# Patient Record
Sex: FEMALE | Race: WHITE | NOT HISPANIC OR LATINO | Employment: FULL TIME | URBAN - METROPOLITAN AREA
[De-identification: names, ages, dates, MRNs, and addresses within clinical notes are randomized per-mention and may not be internally consistent; named-entity substitution may affect disease eponyms.]

---

## 2017-06-23 ENCOUNTER — APPOINTMENT (OUTPATIENT)
Dept: RADIOLOGY | Facility: MEDICAL CENTER | Age: 32
End: 2017-06-23
Attending: EMERGENCY MEDICINE
Payer: COMMERCIAL

## 2017-06-23 ENCOUNTER — HOSPITAL ENCOUNTER (EMERGENCY)
Facility: MEDICAL CENTER | Age: 32
End: 2017-06-23
Attending: EMERGENCY MEDICINE
Payer: COMMERCIAL

## 2017-06-23 VITALS
WEIGHT: 114 LBS | DIASTOLIC BLOOD PRESSURE: 99 MMHG | SYSTOLIC BLOOD PRESSURE: 131 MMHG | HEART RATE: 94 BPM | OXYGEN SATURATION: 100 % | TEMPERATURE: 98.3 F | RESPIRATION RATE: 18 BRPM | BODY MASS INDEX: 20.2 KG/M2 | HEIGHT: 63 IN

## 2017-06-23 DIAGNOSIS — S90.32XA CONTUSION OF LEFT FOOT, INITIAL ENCOUNTER: ICD-10-CM

## 2017-06-23 PROCEDURE — A9270 NON-COVERED ITEM OR SERVICE: HCPCS | Performed by: EMERGENCY MEDICINE

## 2017-06-23 PROCEDURE — 99284 EMERGENCY DEPT VISIT MOD MDM: CPT

## 2017-06-23 PROCEDURE — 73630 X-RAY EXAM OF FOOT: CPT | Mod: LT

## 2017-06-23 PROCEDURE — 700102 HCHG RX REV CODE 250 W/ 637 OVERRIDE(OP): Performed by: EMERGENCY MEDICINE

## 2017-06-23 RX ORDER — OXYCODONE HYDROCHLORIDE AND ACETAMINOPHEN 5; 325 MG/1; MG/1
1 TABLET ORAL ONCE
Status: COMPLETED | OUTPATIENT
Start: 2017-06-23 | End: 2017-06-23

## 2017-06-23 RX ORDER — HYDROCODONE BITARTRATE AND ACETAMINOPHEN 5; 325 MG/1; MG/1
1-2 TABLET ORAL EVERY 4 HOURS PRN
Qty: 20 TAB | Refills: 0 | Status: SHIPPED | OUTPATIENT
Start: 2017-06-23 | End: 2018-09-05

## 2017-06-23 RX ADMIN — OXYCODONE HYDROCHLORIDE AND ACETAMINOPHEN 1 TABLET: 5; 325 TABLET ORAL at 14:58

## 2017-06-23 ASSESSMENT — PAIN SCALES - GENERAL: PAINLEVEL_OUTOF10: 10

## 2017-06-23 NOTE — ED AVS SNAPSHOT
Zite Access Code: [email or web address]  Expires: 7/23/2017  4:18 PM    Your email address is not on file at [facility].  Email Addresses are required for you to sign up for Zite, please contact [phone] to verify your personal information and to provide your email address prior to attempting to register for Zite.    Megan Guerra  [address] [place] [address]    Zite  A secure, online tool to manage your health information     [facility]’s Zite® is a secure, online tool that connects you to your personalized health information from the privacy of your home -- day or night - making it very easy for you to manage your healthcare. Once the activation process is completed, you can even access your medical information using the Zite janet, which is available for free in the Apple Janet store or Google Play store.     To learn more about Zite, visit [email or web address]    There are two levels of access available (as shown below):   My Chart Features  [facility] Primary Care Doctor [facility]  Specialists [facility]  Urgent  Care Non-[facility] Primary Care Doctor   Email your healthcare team securely and privately 24/7 X X X    Manage appointments: schedule your next appointment; view details of past/upcoming appointments X      Request prescription refills. X      View recent personal medical records, including lab and immunizations X X X X   View health record, including health history, allergies, medications X X X X   Read reports about your outpatient visits, procedures, consult and ER notes X X X X   See your discharge summary, which is a recap of your hospital and/or ER visit that includes your diagnosis, lab results, and care plan X X  X     How to register for Zite:  Once your e-mail address has been verified, follow the following steps to sign up for Zite.     1. Go to  [email or web address]  2. Click on the Sign Up Now box, which takes you to the New Member Sign Up page. You will  need to provide the following information:  a. Enter your NXVISION Access Code exactly as it appears at the top of this page. (You will not need to use this code after you’ve completed the sign-up process. If you do not sign up before the expiration date, you must request a new code.)   b. Enter your date of birth.   c. Enter your home email address.   d. Click Submit, and follow the next screen’s instructions.  3. Create a [email or web address] ID. This will be your NXVISION login ID and cannot be changed, so think of one that is secure and easy to remember.  4. Create a NXVISION password. You can change your password at any time.  5. Enter your Password Reset Question and Answer. This can be used at a later time if you forget your password.   6. Enter your e-mail address. This allows you to receive e-mail notifications when new information is available in NXVISION.  7. Click Sign Up. You can now view your health information.    For assistance activating your NXVISION account, call [phone]

## 2017-06-23 NOTE — ED PROVIDER NOTES
"ED Provider Note    Scribed for Master Downing D.O. by Sidney Hall. 6/23/2017  2:21 PM    Primary care provider: No primary care provider on file.  Means of arrival: walk-in  History obtained from: patient  History limited by: none    CHIEF COMPLAINT  Chief Complaint   Patient presents with   • Ankle Pain       HPI  Megan Guerra is a 31 y.o. female who presents to the Emergency Department for evaluation of left foot pain, located to the bottom of her foot which radiates to the side of her foot. This incident occurred less than 45 minutes prior to arrival. Patient reports she was getting in her car when she felt a pop in the ball of her left foot.  She does not take pain medications. She is allergic to Ibuprofen. She denies fever.     REVIEW OF SYSTEMS  Pertinent positives include left foot pain. Pertinent negatives include no fever, no loss of sensation or strength to her foot.  E    PAST MEDICAL HISTORY  Past Medical History   Diagnosis Date   • HPV in female    • Asthma        SURGICAL HISTORY  Past Surgical History   Procedure Laterality Date   • Gyn surgery       tubal ligation        SOCIAL HISTORY  Social History   Substance Use Topics   • Smoking status: Current Every Day Smoker -- 0.75 packs/day     Types: Cigarettes   • Smokeless tobacco: Not on file   • Alcohol Use: No      History   Drug Use No       FAMILY HISTORY  None noted    CURRENT MEDICATIONS  Home Medications     **Home medications have not yet been reviewed for this encounter**          ALLERGIES  Allergies   Allergen Reactions   • Ibuprofen      \"closes up my throat\"       PHYSICAL EXAM  VITAL SIGNS: /99 mmHg  Pulse 94  Temp(Src) 36.8 °C (98.3 °F) (Temporal)  Resp 18  Ht 1.6 m (5' 3\")  Wt 51.71 kg (114 lb)  BMI 20.20 kg/m2  SpO2 100%    Nursing notes and vitals reviewed.  Constitutional: Well developed, Well nourished, No acute distress, Non-toxic appearance.   Musculoskeletal: Intact distal pulses, No edema, No " cyanosis, No clubbing. Good range of motion in all major joints. Tenderness to the left 1st through 5th metatarsal, no step-off deformity, distal cap refill less than 2 seconds, no malleoli tenderness, no fibular head tenderness on the left, dorsalis pedis and posterior tibial pulses are brisk  Neurologic: Alert & oriented x 3, strength and sensation intact left lower extremity Psychiatric: Affect normal for clinical presentation.    DIAGNOSTIC STUDIES/PROCEDURES    RADIOLOGY  DX-FOOT-COMPLETE 3+ LEFT   Final Result      No acute fracture identified.      The radiologist's interpretation of all radiological studies have been reviewed by me.    COURSE & MEDICAL DECISION MAKING  Pertinent Labs & Imaging studies reviewed. (See chart for details)    2:21 PM - Patient seen and examined at bedside. Patient will be treated with Percocet. Ordered DX foot to evaluate her symptoms. Explained certain types of fractures of the foot. Will have her evaluated using a non weight bearing and then weight bearing test.   This is a charming 31 y.o. female that presents with ankle contusion. She has no evidence of fracture on x-ray. She received 1 dose of Percocet in the emergency department prescription for the same. The patient was placed in a postoperative shoe and crutches were given. She is neurovascularly intact pre-and post-splint application.     Although at this point the patient does not have a fracture on x-ray there is a possibility the patient has sustained an occult fracture. For this reason, the patient is to followup with their primary care physician or orthopedist on call within one week if they continue to have pain or return sooner for increasing pain.  3:57 PM Patient reevaluated at bedside. Patient is sitting on a gurney. Discussed the imaging results with the patient which are negative for fractures. Will order a splint application for the patient. She is to follow up with a physician as an outpatient. She  "understands the plan of care and is agreeable. She agrees to be discharged home. /99 mmHg  Pulse 94  Temp(Src) 36.8 °C (98.3 °F) (Temporal)  Resp 18  Ht 1.6 m (5' 3\")  Wt 51.71 kg (114 lb)  BMI 20.20 kg/m2  SpO2 100%    I reviewed prescription monitoring program for patient's narcotic use before prescribing a scheduled drug.The patient will not drink alcohol nor drive with prescribed medications. The patient will return for new or worsening symptoms and is stable at the time of discharge.    The patient is referred to a primary physician for blood pressure management, diabetic screening, and for all other preventative health concerns.    DISPOSITION:  Patient will be discharged home in stable condition.    FOLLOW UP:  [facility], Emergency Dept  [address] [place] [id number] [phone]    If symptoms worsen      OUTPATIENT MEDICATIONS:  New Prescriptions    HYDROCODONE-ACETAMINOPHEN (NORCO) 5-325 MG TAB PER TABLET    Take 1-2 Tabs by mouth every four hours as needed.           FINAL IMPRESSION  1. Contusion of left foot, initial encounter          Sidney SCOTT (Darylibe), am scribing for, and in the presence of, Master Downing D.O    Electronically signed by: Sidney Hall (Eric), 6/23/2017    Master SCOTT D.O. personally performed the services described in this documentation, as scribed by Sidney Hall in my presence, and it is both accurate and complete.    The note accurately reflects work and decisions made by me.  Master Downing  6/23/2017  4:26 PM          "

## 2017-06-23 NOTE — ED NOTES
"Pt just got into town from [place]. Pt states \"popped her left foot\" getting into car. No deformity noted.   "

## 2017-06-23 NOTE — ED AVS SNAPSHOT
Home Care Instructions                                                                                                                Megan Guerra   MRN: [id number]    Department:  [facility], Emergency Dept   Date of Visit:  6/23/2017            [facility], Emergency Dept    [address] [place] [address]    Phone:  [phone]      You were seen by     Master Downing D.O.      Your Diagnosis Was     Contusion of left foot, initial encounter     S90.32XA       These are the medications you received during your hospitalization from 06/23/2017 1325 to 06/23/2017 1624     Date/Time Order Dose Route Action    06/23/2017 1458 oxycodone-acetaminophen (PERCOCET) 5-325 MG per tablet 1 Tab 1 Tab Oral Given      Follow-up Information     1. Follow up with [facility], Emergency Dept.    Specialty:  Emergency Medicine    Why:  If symptoms worsen    Contact information    [address] [place] [id number] [phone]      Medication Information     Review all of your home medications and newly ordered medications with your primary doctor and/or pharmacist as soon as possible. Follow medication instructions as directed by your doctor and/or pharmacist.     Please keep your complete medication list with you and share with your physician. Update the information when medications are discontinued, doses are changed, or new medications (including over-the-counter products) are added; and carry medication information at all times in the event of emergency situations.               Medication List      START taking these medications        Instructions    Morning Afternoon Evening Bedtime    hydrocodone-acetaminophen 5-325 MG Tabs per tablet   Commonly known as:  NORCO        Take 1-2 Tabs by mouth every four hours as needed.   Dose:  1-2 Tab                          ASK your doctor about these medications        Instructions    Morning Afternoon  Evening Bedtime    albuterol 108 (90 BASE) MCG/ACT Aers inhalation aerosol        Inhale 2 Puffs by mouth every 6 hours as needed for Shortness of Breath.   Dose:  2 Puff                             Where to Get Your Medications      You can get these medications from any pharmacy     Bring a paper prescription for each of these medications    - hydrocodone-acetaminophen 5-325 MG Tabs per tablet            Procedures and tests performed during your visit     DX-FOOT-COMPLETE 3+ LEFT            Patient Information     Patient Information    Following emergency treatment: all patient requiring follow-up care must return either to a private physician or a clinic if your condition worsens before you are able to obtain further medical attention, please return to the emergency room.     Billing Information    At [facility], we work to make the billing process streamlined for our patients.  Our Representatives are here to answer any questions you may have regarding your hospital bill.  If you have insurance coverage and have supplied your insurance information to us, we will submit a claim to your insurer on your behalf.  Should you have any questions regarding your bill, we can be reached online or by phone as follows:  Online: You are able pay your bills online or live chat with our representatives about any billing questions you may have. We are here to help Monday - Friday from 8:00am to 7:30pm and 9:00am - 12:00pm on Saturdays.  Please visit [email or web address]  for more information.   Phone:  [phone] or [phone]    Please note that your emergency physician, surgeon, pathologist, radiologist, anesthesiologist, and other specialists are not employed by [facility] and will therefore bill separately for their services.  Please contact them directly for any questions concerning their bills at the numbers below:     Emergency Physician Services:  [phone]  [facility]:  [phone]  [facility]:  [phone]  [facility]:  [phone]    1. Your final bill may vary from the amount quoted upon discharge if all procedures are not complete at that time, or if your doctor has additional procedures of which we are not aware. You will receive an additional bill if you return to the Emergency Department at [facility] for suture removal regardless of the facility of which the sutures were placed.     2. Please arrange for settlement of this account at the emergency registration.    3. All self-pay accounts are due in full at the time of treatment.  If you are unable to meet this obligation then payment is expected within 4-5 days.     4. If you have had radiology studies (CT, X-ray, Ultrasound, MRI), you have received a preliminary result during your emergency department visit. Please contact the radiology department [phone] to receive a copy of your final result. Please discuss the Final result with your primary physician or with the follow up physician provided.     Crisis Hotline:  [place] Crisis Hotline:  8-271-VJAHCDV or 1-205.311.6785  [place] Crisis Hotline:    1-532.125.8988 or 235-367-6932         ED Discharge Follow Up Questions    1. In order to provide you with very good care, we would like to follow up with a phone call in the next few days.  May we have your permission to contact you?     YES /  NO    2. What is the best phone number to call you? (       )_____-__________    3. What is the best time to call you?      Morning  /  Afternoon  /  Evening                   Patient Signature:  ____________________________________________________________    Date:  ____________________________________________________________

## 2017-06-23 NOTE — ED AVS SNAPSHOT
6/23/2017    Megan Guerra  [address] [address]    Dear Megan:    [facility] wants to ensure your discharge home is safe and you or your loved ones have had all of your questions answered regarding your care after you leave the hospital.    Below is a list of resources and contact information should you have any questions regarding your hospital stay, follow-up instructions, or active medical symptoms.    Questions or Concerns Regarding… Contact   Medical Questions Related to Your Discharge  (7 days a week, 8am-5pm) Contact a Nurse Care Coordinator   [phone]   Medical Questions Not Related to Your Discharge  (24 hours a day / 7 days a week)  Contact the Nurse Health Line   [phone]    Medications or Discharge Instructions Refer to your discharge packet   or contact your [facility] Primary Care Provider   [phone]   Follow-up Appointment(s) Schedule your appointment via Meridium   or contact Scheduling [phone]   Billing Review your statement via Meridium  or contact Billing [phone]   Medical Records Review your records via Meridium   or contact Medical Records [phone]     You may receive a telephone call within two days of discharge. This call is to make certain you understand your discharge instructions and have the opportunity to have any questions answered. You can also easily access your medical information, test results and upcoming appointments via the Meridium free online health management tool. You can learn more and sign up at [email or web address]. For assistance setting up your Meridium account, please call [phone].    Once again, we want to ensure your discharge home is safe and that you have a clear understanding of any next steps in your care. If you have any questions or concerns, please do not hesitate to contact us, we are here for you. Thank you for choosing [facility] for your healthcare needs.    Sincerely,    Your [facility] Healthcare Team

## 2017-10-22 ENCOUNTER — APPOINTMENT (OUTPATIENT)
Dept: RADIOLOGY | Facility: MEDICAL CENTER | Age: 32
End: 2017-10-22
Attending: EMERGENCY MEDICINE
Payer: COMMERCIAL

## 2017-10-22 ENCOUNTER — HOSPITAL ENCOUNTER (EMERGENCY)
Facility: MEDICAL CENTER | Age: 32
End: 2017-10-22
Attending: EMERGENCY MEDICINE
Payer: COMMERCIAL

## 2017-10-22 VITALS
RESPIRATION RATE: 16 BRPM | TEMPERATURE: 99.6 F | SYSTOLIC BLOOD PRESSURE: 117 MMHG | BODY MASS INDEX: 20.24 KG/M2 | OXYGEN SATURATION: 97 % | HEIGHT: 62 IN | WEIGHT: 110 LBS | HEART RATE: 82 BPM | DIASTOLIC BLOOD PRESSURE: 77 MMHG

## 2017-10-22 DIAGNOSIS — S69.90XS: ICD-10-CM

## 2017-10-22 DIAGNOSIS — R20.2 PARESTHESIA: ICD-10-CM

## 2017-10-22 PROCEDURE — 99284 EMERGENCY DEPT VISIT MOD MDM: CPT

## 2017-10-22 PROCEDURE — 700102 HCHG RX REV CODE 250 W/ 637 OVERRIDE(OP): Performed by: EMERGENCY MEDICINE

## 2017-10-22 PROCEDURE — 73130 X-RAY EXAM OF HAND: CPT | Mod: LT

## 2017-10-22 PROCEDURE — A9270 NON-COVERED ITEM OR SERVICE: HCPCS | Performed by: EMERGENCY MEDICINE

## 2017-10-22 RX ORDER — CEPHALEXIN 500 MG/1
500 CAPSULE ORAL 3 TIMES DAILY
Qty: 21 CAP | Refills: 0 | Status: SHIPPED | OUTPATIENT
Start: 2017-10-22 | End: 2017-10-29

## 2017-10-22 RX ORDER — CEPHALEXIN 500 MG/1
500 CAPSULE ORAL ONCE
Status: COMPLETED | OUTPATIENT
Start: 2017-10-22 | End: 2017-10-22

## 2017-10-22 RX ORDER — HYDROCODONE BITARTRATE AND ACETAMINOPHEN 7.5; 325 MG/1; MG/1
1 TABLET ORAL ONCE
Status: COMPLETED | OUTPATIENT
Start: 2017-10-22 | End: 2017-10-22

## 2017-10-22 RX ADMIN — CEPHALEXIN 500 MG: 500 CAPSULE ORAL at 18:19

## 2017-10-22 RX ADMIN — HYDROCODONE BITARTRATE AND ACETAMINOPHEN 1 TABLET: 7.5; 325 TABLET ORAL at 16:58

## 2017-10-22 ASSESSMENT — PAIN SCALES - GENERAL
PAINLEVEL_OUTOF10: 3
PAINLEVEL_OUTOF10: 8

## 2017-10-22 ASSESSMENT — LIFESTYLE VARIABLES: DO YOU DRINK ALCOHOL: NO

## 2017-10-22 NOTE — LETTER
"  FORM C-4:  EMPLOYEE’S CLAIM FOR COMPENSATION/ REPORT OF INITIAL TREATMENT  EMPLOYEE’S CLAIM - PROVIDE ALL INFORMATION REQUESTED   First Name  Megan Last Name  Charlie Birthdate             Age  1985 32 y.o. Sex  female Claim Number   Home Employee Address  [address]  [facility]                                     Zip  [address] Height  1.575 m (5' 2\") Weight  49.9 kg (110 lb) [facility]  [id number]   Mailing Employee Address                           [address]   [facility]               Zip  [address] Telephone  [phone] (home)  Primary Language Spoken  ENGLISH   Insurer  *** Third Party   MISC WORKERS COMP Employee's Occupation (Job Title) When Injury or Occupational Disease Occurred     Employer's Name   Telephone      Employer Address   City   State   Zip     Date of Injury  10/20/2017       Hour of Injury  12:38 PM Date Employer Notified  10/20/2017 Last Day of Work after Injury or Occupational Disease  10/20/2017 Supervisor to Whom Injury Reported  Osman Blanco   Address or Location of Accident (if applicable)  [[address]   What were you doing at the time of accident? (if applicable)  Moving packages down a conveyor belt    How did this injury or occupational disease occur? Be specific and answer in detail. Use additional sheet if necessary)  pushing packages down the conveyor line on to a table when middle and ring finger were pulled into conveyor transition   If you believe that you have an occupational disease, when did you first have knowledge of the disability and it relationship to your employment?   Witnesses to the Accident  work crew     Nature of Injury or Occupational Disease  Contusion  Part(s) of Body Injured or Affected  Hand (L), Finger (L), N/A    I certify that the above is true and correct to the best of my knowledge and that I have provided this information in order to obtain the benefits of [place] " [place] inclusive or Chapter 617 of [place]).  I hereby authorize any physician, chiropractor, surgeon, practitioner, or other person, any hospital, including [facility] or [facility] hospital, any medical service organization, any insurance company, or other institution or organization to release to each other, any medical or other information, including benefits paid or payable, pertinent to this injury or disease, except information relative to diagnosis, treatment and/or counseling for AIDS, psychological conditions, alcohol or controlled substances, for which I must give specific authorization.  A Photostat of this authorization shall be as valid as the original.   Date Place   Employee’s Signature   THIS REPORT MUST BE COMPLETED AND MAILED WITHIN 3 WORKING DAYS OF TREATMENT   Place  [facility], EMERGENCY DEPT  Name of Facility   [facility]   Date  10/22/2017 Diagnosis  No diagnosis found. Is there evidence the injured employee was under the influence of alcohol and/or another controlled substance at the time of accident?   Hour  5:59 PM Description of Injury or Disease       Treatment     Have you advised the patient to remain off work five days or more?             X-Ray Findings      If Yes   From Date    To Date      From information given by the employee, together with medical evidence, can you directly connect this injury or occupational disease as job incurred?    If No, is the employee capable of: Full Duty    Modified Duty      Is additional medical care by a physician indicated?    If Modified Duty, Specify any Limitations / Restrictions        Do you know of any previous injury or disease contributing to this condition or occupational disease?      Date  10/22/2017 Print Doctor’s Name  Jcarlos Bass I certify the employer’s copy of this form was mailed on:   Address  [address] [place] [address]  "[id number]  [phone] Insurer’s Use Only   [facility] Zip  [address]    Provider’s Tax ID Number  [id number] Telephone  Dept: [phone]    Doctor’s Signature    Degree       Original - TREATING PHYSICIAN OR CHIROPRACTOR   Pg 2-Insurer/TPA   Pg 3-Employer   Pg 4-Employee                                                                                                  Form C-4 (rev01/03)     BRIEF DESCRIPTION OF RIGHTS AND BENEFITS  (Pursuant to [place]RS 616C.050)    Notice of Injury or Occupational Disease (Incident Report Form C-1): If an injury or occupational disease (OD) arises out of and in the course of employment, you must provide written notice to your employer as soon as practicable, but no later than 7 days after the accident or OD. Your employer shall maintain a sufficient supply of the required forms.    Claim for Compensation (Form C-4): If medical treatment is sought, the form C-4 is available at the place of initial treatment. A completed \"Claim for Compensation\" (Form C-4) must be filed within 90 days after an accident or OD. The treating physician or chiropractor must, within 3 working days after treatment, complete and mail to the employer, the employer's insurer and third-party , the Claim for Compensation.    Medical Treatment: If you require medical treatment for your on-the-job injury or OD, you may be required to select a physician or chiropractor from a list provided by your workers’ compensation insurer, if it has contracted with an Organization for Managed Care (MCO) or Preferred Provider Organization (PPO) or providers of health care. If your employer has not entered into a contract with an MCO or PPO, you may select a physician or chiropractor from the Panel of Physicians and Chiropractors. Any medical costs related to your industrial injury or OD will be paid by your insurer.    Temporary Total Disability (TTD): If your doctor has certified that you are unable " to work for a period of at least 5 consecutive days, or 5 cumulative days in a 20-day period, or places restrictions on you that your employer does not accommodate, you may be entitled to TTD compensation.    Temporary Partial Disability (TPD): If the wage you receive upon reemployment is less than the compensation for TTD to which you are entitled, the insurer may be required to pay you TPD compensation to make up the difference. TPD can only be paid for a maximum of 24 months.    Permanent Partial Disability (PPD): When your medical condition is stable and there is an indication of a PPD as a result of your injury or OD, within 30 days, your insurer must arrange for an evaluation by a rating physician or chiropractor to determine the degree of your PPD. The amount of your PPD award depends on the date of injury, the results of the PPD evaluation and your age and wage.    Permanent Total Disability (PTD): If you are medically certified by a treating physician or chiropractor as permanently and totally disabled and have been granted a PTD status by your insurer, you are entitled to receive monthly benefits not to exceed 66 2/3% of your average monthly wage. The amount of your PTD payments is subject to reduction if you previously received a PPD award.    Vocational Rehabilitation Services: You may be eligible for vocational rehabilitation services if you are unable to return to the job due to a permanent physical impairment or permanent restrictions as a result of your injury or occupational disease.    Transportation and Per Fani Reimbursement: You may be eligible for travel expenses and per fani associated with medical treatment.  Reopening: You may be able to reopen your claim if your condition worsens after claim closure.    Appeal Process: If you disagree with a written determination issued by the insurer or the insurer does not respond to your request, you may appeal to the Department of Administration,  , by following the instructions contained in your determination letter. You must appeal the determination within 70 days from the date of the determination letter at [address], [address], [place], [place] [address], or [address], [place], [place] [address]. If you disagree with the  decision, you may appeal to the Department of Administration, . You must file your appeal within 30 days from the date of the  decision letter at [address], [address], [place], [place] [address], or [address], [address], [place], [place] [address]. If you disagree with a decision of an , you may file a petition for judicial review with the District Court. You must do so within 30 days of the Appeal Officer’s decision. You may be represented by an  at your own expense or you may contact the [facility] for possible representation.    [place]  for Injured Workers (NAIW): If you disagree with a  decision, you may request that NAIW represent you without charge at an  Hearing. For information regarding denial of benefits, you may contact the [facility] at: [address], [address], [place] [address], [phone], or [address], [place] [address], [phone]    To File a Complaint with the Division: If you wish to file a complaint with the  of the Division of Industrial Relations (DIR), please contact the Workers’ Compensation Section, [address], [address], [place], [place] [address], telephone [phone] [phone], or [address], [facility] [address], [place] [address], telephone ([phone].    For assistance with Workers’ Compensation Issues: you may contact the Office of the Governor Consumer Health Assistance, [address], [address], [place], [place] [address], Toll Free 1-384.955.6951, Web  site: http://chris..nv.us, E-mail [email or web address]                                                                                                                                                                               __________________________________________________________________                                    _________________            Employee Name / Signature                                                                                                                            Date                                       D-2 (rev. 10/07)

## 2017-10-22 NOTE — LETTER
"  FORM C-4:  EMPLOYEE’S CLAIM FOR COMPENSATION/ REPORT OF INITIAL TREATMENT  EMPLOYEE’S CLAIM - PROVIDE ALL INFORMATION REQUESTED   First Name  Megan Last Name  Charlie Birthdate             Age  1985 32 y.o. Sex  female Claim Number   Home Employee Address  [address]  [facility]                                     Zip  [address] Height  1.575 m (5' 2\") Weight  49.9 kg (110 lb) [facility]  [id number]   Mailing Employee Address                           [address]   [facility]               Zip  [address] Telephone  [phone] (home)  Primary Language Spoken  ENGLISH   Insurer  *** Third Party   MISC WORKERS COMP Employee's Occupation (Job Title) When Injury or Occupational Disease Occurred     Employer's Name   Telephone      Employer Address   City   State   Zip     Date of Injury  10/20/2017       Hour of Injury  12:38 PM Date Employer Notified  10/20/2017 Last Day of Work after Injury or Occupational Disease  10/20/2017 Supervisor to Whom Injury Reported  Osman Blanco   Address or Location of Accident (if applicable)  [[address] [address]   What were you doing at the time of accident? (if applicable)  Moving packages down a conveyor belt    How did this injury or occupational disease occur? Be specific and answer in detail. Use additional sheet if necessary)  pushing packages down the conveyor line on to a table when middle and ring finger were pulled into conveyor transition   If you believe that you have an occupational disease, when did you first have knowledge of the disability and it relationship to your employment?   Witnesses to the Accident  work crew     Nature of Injury or Occupational Disease  Contusion  Part(s) of Body Injured or Affected  Hand (L), Finger (L), N/A    I certify that the above is true and correct to the best of my knowledge and that I have provided this information in order to obtain the benefits of [place]s [place] " [place] and [place] ([place] 616A to 616[place], inclusive or Chapter 617 of [place]RS).  I hereby authorize any physician, chiropractor, surgeon, practitioner, or other person, any hospital, including [facility] hospital, any medical service organization, any insurance company, or other institution or organization to release to each other, any medical or other information, including benefits paid or payable, pertinent to this injury or disease, except information relative to diagnosis, treatment and/or counseling for AIDS, psychological conditions, alcohol or controlled substances, for which I must give specific authorization.  A Photostat of this authorization shall be as valid as the original.   Date Place   Employee’s Signature   THIS REPORT MUST BE COMPLETED AND MAILED WITHIN 3 WORKING DAYS OF TREATMENT   Place  [facility], EMERGENCY DEPT  Name of Facility   [facility]   Date  10/22/2017 Diagnosis  No diagnosis found. Is there evidence the injured employee was under the influence of alcohol and/or another controlled substance at the time of accident?   Hour  6:10 PM Description of Injury or Disease       Treatment     Have you advised the patient to remain off work five days or more?             X-Ray Findings      If Yes   From Date    To Date      From information given by the employee, together with medical evidence, can you directly connect this injury or occupational disease as job incurred?    If No, is the employee capable of: Full Duty    Modified Duty      Is additional medical care by a physician indicated?    If Modified Duty, Specify any Limitations / Restrictions        Do you know of any previous injury or disease contributing to this condition or occupational disease?      Date  10/22/2017 Print Doctor’s Name  Jcarlos Bass I certify the employer’s copy of this form was mailed on:   Address  [address] [place] [address]  "[address]  [phone] Insurer’s Use Only   [facility] Zip  [address]    Provider’s Tax ID Number  [id number] Telephone  Dept: [phone]    Doctor’s Signature    Degree       Original - TREATING PHYSICIAN OR CHIROPRACTOR   Pg 2-Insurer/TPA   Pg 3-Employer   Pg 4-Employee                                                                                                  Form C-4 (rev01/03)     BRIEF DESCRIPTION OF RIGHTS AND BENEFITS  (Pursuant to NRS 616C.050)    Notice of Injury or Occupational Disease (Incident Report Form C-1): If an injury or occupational disease (OD) arises out of and in the course of employment, you must provide written notice to your employer as soon as practicable, but no later than 7 days after the accident or OD. Your employer shall maintain a sufficient supply of the required forms.    Claim for Compensation (Form C-4): If medical treatment is sought, the form C-4 is available at the place of initial treatment. A completed \"Claim for Compensation\" (Form C-4) must be filed within 90 days after an accident or OD. The treating physician or chiropractor must, within 3 working days after treatment, complete and mail to the employer, the employer's insurer and third-party , the Claim for Compensation.    Medical Treatment: If you require medical treatment for your on-the-job injury or OD, you may be required to select a physician or chiropractor from a list provided by your workers’ compensation insurer, if it has contracted with an Organization for Managed Care (MCO) or Preferred Provider Organization (PPO) or providers of health care. If your employer has not entered into a contract with an MCO or PPO, you may select a physician or chiropractor from the Panel of Physicians and Chiropractors. Any medical costs related to your industrial injury or OD will be paid by your insurer.    Temporary Total Disability (TTD): If your doctor has certified that you are unable " to work for a period of at least 5 consecutive days, or 5 cumulative days in a 20-day period, or places restrictions on you that your employer does not accommodate, you may be entitled to TTD compensation.    Temporary Partial Disability (TPD): If the wage you receive upon reemployment is less than the compensation for TTD to which you are entitled, the insurer may be required to pay you TPD compensation to make up the difference. TPD can only be paid for a maximum of 24 months.    Permanent Partial Disability (PPD): When your medical condition is stable and there is an indication of a PPD as a result of your injury or OD, within 30 days, your insurer must arrange for an evaluation by a rating physician or chiropractor to determine the degree of your PPD. The amount of your PPD award depends on the date of injury, the results of the PPD evaluation and your age and wage.    Permanent Total Disability (PTD): If you are medically certified by a treating physician or chiropractor as permanently and totally disabled and have been granted a PTD status by your insurer, you are entitled to receive monthly benefits not to exceed 66 2/3% of your average monthly wage. The amount of your PTD payments is subject to reduction if you previously received a PPD award.    Vocational Rehabilitation Services: You may be eligible for vocational rehabilitation services if you are unable to return to the job due to a permanent physical impairment or permanent restrictions as a result of your injury or occupational disease.    Transportation and Per Fani Reimbursement: You may be eligible for travel expenses and per fani associated with medical treatment.  Reopening: You may be able to reopen your claim if your condition worsens after claim closure.    Appeal Process: If you disagree with a written determination issued by the insurer or the insurer does not respond to your request, you may appeal to the Department of Administration,  , by following the instructions contained in your determination letter. You must appeal the determination within 70 days from the date of the determination letter at [address], [address], [place], [place] [address], or [address], [place], [place] [address]. If you disagree with the  decision, you may appeal to the Department of Administration, . You must file your appeal within 30 days from the date of the  decision letter at [address], [address], [place], [place] [address], or [address], [address], [place], [place] [address]. If you disagree with a decision of an , you may file a petition for judicial review with the District Court. You must do so within 30 days of the Appeal Officer’s decision. You may be represented by an  at your own expense or you may contact the [facility] for possible representation.    [place]  for Injured Workers (NAIW): If you disagree with a  decision, you may request that NAIW represent you without charge at an  Hearing. For information regarding denial of benefits, you may contact the [facility] at: [address], [address], [place] [address], [phone], or [address], [place] [address], [phone]    To File a Complaint with the Division: If you wish to file a complaint with the  of the Division of Industrial Relations (DIR), please contact the Workers’ Compensation Section, [address], [address], [place], [place] [address], telephone ([phone]) [phone], or [address] [facility] [address], [place] [address], telephone [phone].    For assistance with Workers’ Compensation Issues: you may contact the Office of the Governor Consumer Health Assistance, [address], [address], [place], [place] [address], Toll Free 1-538.861.6838, Web  site: http://chris..nv.us, E-mail [email or web address]                                                                                                                                                                               __________________________________________________________________                                    _________________            Employee Name / Signature                                                                                                                            Date                                       D-2 (rev. 10/07)

## 2017-10-22 NOTE — LETTER
[facility], EMERGENCY DEPT   [address] [place], [place] [address]  Phone: Dept: [phone] - Fax:        [facility] Progress Report and Disability Certification  Date of Service: 10/22/2017   No Show:  No  Date / Time of Next Visit: 10/24/2017   Claim Information   Patient Name: Megan Guerra  Claim Number:     Employer:   [employer] Date of Injury: 10/20/2017     Insurer / TPA: Jessica ID / SSN:    Occupation:  Diagnosis: Diagnoses of Finger injury, sequela and Paresthesia were pertinent to this visit.    Medical Information   Related to Industrial Injury? Yes ***   Subjective Complaints:  Pain of 3rd and 4th fingers after laceration repair yesterday at [facility]   Objective Findings: Avulsion of skin of area between PIP and DIP joint extending into the volar fat pad of the 3rd and 4th digits of left hand. Limited range of motion   Pre-Existing Condition(s):     Assessment:   Condition Worsened    Status: Additional Care Required  Permanent Disability:No    Plan: Medication    Diagnostics: X-ray    Comments:  No fracture present    Disability Information   Status: Temporarily Totally Disabled    From:  10/22/2017  Through: 10/24/2017 Restrictions are: Temporary   Physical Restrictions   Sitting:    Standing:    Stooping:    Bending:      Squatting:    Walking:    Climbing:    Pushing:      Pulling:    Other:    Reaching Above Shoulder (L):   Reaching Above Shoulder (R):       Reaching Below Shoulder (L):    Reaching Below Shoulder (R):      Not to exceed Weight Limits   Carrying(hrs):   Weight Limit(lb):   Lifting(hrs):   Weight  Limit(lb):     Comments: No use of left hand for grasping until cleared by occupational therapy    Repetitive Actions   Hands: i.e. Fine Manipulations from Grasping:     Feet: i.e. Operating Foot Controls:     Driving / Operate Machinery:     Physician Name: Angelica Bass Physician Signature: ANGELICA Lin M.D. e-Signature:   , Medical Director   Clinic Name / Location: [facility], EMERGENCY DEPT  [address]  [place] [address]  [phone]     Clinic Phone Number: Dept: [phone]   Appointment Time:  Visit Start Time:    Check-In Time:  3:51 PM Visit Discharge Time:    Original-Treating Physician or Chiropractor    Page 2-Insurer/TPA    Page 3-Employer    Page 4-Employee

## 2017-10-22 NOTE — LETTER
"  FORM C-4:  EMPLOYEE’S CLAIM FOR COMPENSATION/ REPORT OF INITIAL TREATMENT  EMPLOYEE’S CLAIM - PROVIDE ALL INFORMATION REQUESTED   First Name  Megan Last Name  Charlie Birthdate             Age  1985 32 y.o. Sex  female Claim Number   Home Employee Address  [address]  [place]                                     Zip  [address] Height  1.575 m (5' 2\") Weight  49.9 kg (110 lb) [facility]     Mailing Employee Address                           [address]   [place]               Zip  [address] Telephone  [phone] (home)  Primary Language Spoken  ENGLISH   Insurer  UNKNOWN Third Party   MISC WORKERS COMP Employee's Occupation (Job Title) When Injury or Occupational Disease Occurred     Employer's Name  [employer] Telephone   [phone]   Employer Address  [address] [facility] Zip  [address]   Date of Injury  10/20/2017       Hour of Injury  12:38 PM Date Employer Notified  10/20/2017 Last Day of Work after Injury or Occupational Disease  10/20/2017 Supervisor to Whom Injury Reported  Osman Blanco   Address or Location of Accident (if applicable)  [[address]]   What were you doing at the time of accident? (if applicable)  Moving packages down a conveyor belt    How did this injury or occupational disease occur? Be specific and answer in detail. Use additional sheet if necessary)  pushing packages down the conveyor line on to a table when middle and ring finger were pulled into conveyor transition   If you believe that you have an occupational disease, when did you first have knowledge of the disability and it relationship to your employment?   Witnesses to the Accident  work crew     Nature of Injury or Occupational Disease  Contusion  Part(s) of Body Injured or Affected  Hand (L), Finger (L), N/A    I certify that the above is true and correct to the best of my knowledge and that I have provided this information in order " to obtain the benefits of [place] or [place] 617 of [place]  I hereby authorize any physician, chiropractor, surgeon, practitioner, or other person, any hospital, including [facility] or [facility] hospital, any medical service organization, any insurance company, or other institution or organization to release to each other, any medical or other information, including benefits paid or payable, pertinent to this injury or disease, except information relative to diagnosis, treatment and/or counseling for AIDS, psychological conditions, alcohol or controlled substances, for which I must give specific authorization.  A Photostat of this authorization shall be as valid as the original.   Date Place   Employee’s Signature   THIS REPORT MUST BE COMPLETED AND MAILED WITHIN 3 WORKING DAYS OF TREATMENT   Place  [facility], EMERGENCY DEPT  Name of Facility   [facility]   Date  10/22/2017 Diagnosis  No diagnosis found. Is there evidence the injured employee was under the influence of alcohol and/or another controlled substance at the time of accident?   Hour  6:16 PM Description of Injury or Disease       Treatment     Have you advised the patient to remain off work five days or more?             X-Ray Findings      If Yes   From Date    To Date      From information given by the employee, together with medical evidence, can you directly connect this injury or occupational disease as job incurred?    If No, is the employee capable of: Full Duty    Modified Duty      Is additional medical care by a physician indicated?    If Modified Duty, Specify any Limitations / Restrictions        Do you know of any previous injury or disease contributing to this condition or occupational disease?      Date  10/22/2017 Print Doctor’s Name  Jcarlos Bass I certify the employer’s copy of this form was mailed on:   "  Address  [address] [place] [id number] [phone] Insurer’s Use Only   [facility]  [address]    Provider’s Tax ID Number  [id number] Telephone  Dept: [phone]    Doctor’s Signature  e-ANGELICA Ellis M.D. Degree       Original - TREATING PHYSICIAN OR CHIROPRACTOR   Pg 2-Insurer/TPA   Pg 3-Employer   Pg 4-Employee                                                                                                  Form C-4 (rev01/03)     BRIEF DESCRIPTION OF RIGHTS AND BENEFITS  (Pursuant to [place]RS 616C.050)    Notice of Injury or Occupational Disease (Incident Report Form C-1): If an injury or occupational disease (OD) arises out of and in the course of employment, you must provide written notice to your employer as soon as practicable, but no later than 7 days after the accident or OD. Your employer shall maintain a sufficient supply of the required forms.    Claim for Compensation (Form C-4): If medical treatment is sought, the form C-4 is available at the place of initial treatment. A completed \"Claim for Compensation\" (Form C-4) must be filed within 90 days after an accident or OD. The treating physician or chiropractor must, within 3 working days after treatment, complete and mail to the employer, the employer's insurer and third-party , the Claim for Compensation.    Medical Treatment: If you require medical treatment for your on-the-job injury or OD, you may be required to select a physician or chiropractor from a list provided by your workers’ compensation insurer, if it has contracted with an Organization for Managed Care (MCO) or Preferred Provider Organization (PPO) or providers of health care. If your employer has not entered into a contract with an MCO or PPO, you may select a physician or chiropractor from the Panel of Physicians and Chiropractors. Any medical costs related to your industrial injury or OD will be paid by your insurer.    Temporary Total " Disability (TTD): If your doctor has certified that you are unable to work for a period of at least 5 consecutive days, or 5 cumulative days in a 20-day period, or places restrictions on you that your employer does not accommodate, you may be entitled to TTD compensation.    Temporary Partial Disability (TPD): If the wage you receive upon reemployment is less than the compensation for TTD to which you are entitled, the insurer may be required to pay you TPD compensation to make up the difference. TPD can only be paid for a maximum of 24 months.    Permanent Partial Disability (PPD): When your medical condition is stable and there is an indication of a PPD as a result of your injury or OD, within 30 days, your insurer must arrange for an evaluation by a rating physician or chiropractor to determine the degree of your PPD. The amount of your PPD award depends on the date of injury, the results of the PPD evaluation and your age and wage.    Permanent Total Disability (PTD): If you are medically certified by a treating physician or chiropractor as permanently and totally disabled and have been granted a PTD status by your insurer, you are entitled to receive monthly benefits not to exceed 66 2/3% of your average monthly wage. The amount of your PTD payments is subject to reduction if you previously received a PPD award.    Vocational Rehabilitation Services: You may be eligible for vocational rehabilitation services if you are unable to return to the job due to a permanent physical impairment or permanent restrictions as a result of your injury or occupational disease.    Transportation and Per Fani Reimbursement: You may be eligible for travel expenses and per fani associated with medical treatment.  Reopening: You may be able to reopen your claim if your condition worsens after claim closure.    Appeal Process: If you disagree with a written determination issued by the insurer or the insurer does not respond to your  request, you may appeal to the Department of Administration, , by following the instructions contained in your determination letter. You must appeal the determination within 70 days from the date of the determination letter at [address], [address], [place], [place] [address], or [address], [address], [place], [place] [address]. If you disagree with the  decision, you may appeal to the Department of Administration, . You must file your appeal within 30 days from the date of the  decision letter at [address], [address], [place], [place] [address], or [address], [address], [place], [place] [address]. If you disagree with a decision of an , you may file a petition for judicial review with the District Court. You must do so within 30 days of the Appeal Officer’s decision. You may be represented by an  at your own expense or you may contact the [facility] for possible representation.    [place]  for Injured Workers (NAIW): If you disagree with a  decision, you may request that NAIW represent you without charge at an  Hearing. For information regarding denial of benefits, you may contact the [facility] at: [address], [address], [place] [address], [phone], or [address], [place] [address], [phone]    To File a Complaint with the Division: If you wish to file a complaint with the  of the Division of Industrial Relations (DIR), please contact the Workers’ Compensation Section, [address], [address], [place], [place] [address], telephone (815) [phone], or [address], [facility] [address], [place] [address], telephone [phone].    For assistance with Workers’ Compensation Issues: you may contact the Office of the Governor Consumer Health Assistance, [address], [address], [place], [place] [address], Toll Free 1-904.738.4307, Web site: http://govcha.[facility].nv., E-mail carlos [email or web address].                                                                                                                                                                               __________________________________________________________________                                    _________________            Employee Name / Signature                                                                                                                            Date                                       D-2 (rev. 10/07)

## 2017-10-22 NOTE — LETTER
"  FORM C-4:  EMPLOYEE’S CLAIM FOR COMPENSATION/ REPORT OF INITIAL TREATMENT  EMPLOYEE’S CLAIM - PROVIDE ALL INFORMATION REQUESTED   First Name  Megan Last Name  Charlie Birthdate             Age  1985 32 y.o. Sex  female Claim Number   Home Employee Address  [address]  [facility]                                     Zip  [address] Height  1.575 m (5' 2\") Weight  49.9 kg (110 lb) [facility]     Mailing Employee Address                           [address]   [facility]               Zip  [address] Telephone  [phone] (home)  Primary Language Spoken  ENGLISH   Insurer  UNKNOWN Third Party   MISC WORKERS COMP Employee's Occupation (Job Title) When Injury or Occupational Disease Occurred     Employer's Name  [employer] Telephone  [phone]    Employer Address  [address] [facility] Zip  [address]   Date of Injury  10/20/2017       Hour of Injury  12:38 PM Date Employer Notified  10/20/2017 Last Day of Work after Injury or Occupational Disease  10/20/2017 Supervisor to Whom Injury Reported  Osman Blanco   Address or Location of Accident (if applicable)  [[address]]   What were you doing at the time of accident? (if applicable)  Moving packages down a conveyor belt    How did this injury or occupational disease occur? Be specific and answer in detail. Use additional sheet if necessary)  pushing packages down the conveyor line on to a table when middle and ring finger were pulled into conveyor transition   If you believe that you have an occupational disease, when did you first have knowledge of the disability and it relationship to your employment?   Witnesses to the Accident  work crew     Nature of Injury or Occupational Disease  Contusion  Part(s) of Body Injured or Affected  Hand (L), Finger (L), N/A    I certify that the above is true and correct to the best of my knowledge and that I have provided this information in order " to obtain the benefits of [place]’s [place] 616A [place], inclusive or Chapter 617 of [place]RS).  I hereby authorize any physician, chiropractor, surgeon, practitioner, or other person, any hospital, including [facility], any medical service organization, any insurance company, or other institution or organization to release to each other, any medical or other information, including benefits paid or payable, pertinent to this injury or disease, except information relative to diagnosis, treatment and/or counseling for AIDS, psychological conditions, alcohol or controlled substances, for which I must give specific authorization.  A Photostat of this authorization shall be as valid as the original.   Date Place   Employee’s Signature   THIS REPORT MUST BE COMPLETED AND MAILED WITHIN 3 WORKING DAYS OF TREATMENT   Place  [facility], EMERGENCY DEPT  Name of Facility   [facility]   Date  10/22/2017 Diagnosis  (S69.90XS) Finger injury, sequela  (R20.2) Paresthesia Is there evidence the injured employee was under the influence of alcohol and/or another controlled substance at the time of accident?   Hour  6:22 PM Description of Injury or Disease  Finger injury, sequela  Paresthesia     Treatment     Have you advised the patient to remain off work five days or more?             X-Ray Findings      If Yes   From Date    To Date      From information given by the employee, together with medical evidence, can you directly connect this injury or occupational disease as job incurred?    If No, is the employee capable of: Full Duty    Modified Duty      Is additional medical care by a physician indicated?    If Modified Duty, Specify any Limitations / Restrictions        Do you know of any previous injury or disease contributing to this condition or occupational disease?      Date  10/22/2017 Print Doctor’s  "Name  Angelica Bass I certify the employer’s copy of this form was mailed on:   Address  [address] [address] [phone] Insurer’s Use Only   [facility]  [address]    Provider’s Tax ID Number  [id number] Telephone  Dept: [phone]    Doctor’s Signature  e-ANGELICA Ellis M.D. Degree       Original - TREATING PHYSICIAN OR CHIROPRACTOR   Pg 2-Insurer/TPA   Pg 3-Employer   Pg 4-Employee                                                                                                  Form C-4 (rev01/03)     BRIEF DESCRIPTION OF RIGHTS AND BENEFITS  ([place]ursuant to [place] 616C.050)    Notice of Injury or Occupational Disease (Incident Report Form C-1): If an injury or occupational disease (OD) arises out of and in the course of employment, you must provide written notice to your employer as soon as practicable, but no later than 7 days after the accident or OD. Your employer shall maintain a sufficient supply of the required forms.    Claim for Compensation (Form C-4): If medical treatment is sought, the form C-4 is available at the place of initial treatment. A completed \"Claim for Compensation\" (Form C-4) must be filed within 90 days after an accident or OD. The treating physician or chiropractor must, within 3 working days after treatment, complete and mail to the employer, the employer's insurer and third-party , the Claim for Compensation.    Medical Treatment: If you require medical treatment for your on-the-job injury or OD, you may be required to select a physician or chiropractor from a list provided by your workers’ compensation insurer, if it has contracted with an Organization for Managed Care (MCO) or Preferred Provider Organization (PPO) or providers of health care. If your employer has not entered into a contract with an MCO or PPO, you may select a physician or chiropractor from the Panel of Physicians and Chiropractors. Any medical costs related to your " industrial injury or OD will be paid by your insurer.    Temporary Total Disability (TTD): If your doctor has certified that you are unable to work for a period of at least 5 consecutive days, or 5 cumulative days in a 20-day period, or places restrictions on you that your employer does not accommodate, you may be entitled to TTD compensation.    Temporary Partial Disability (TPD): If the wage you receive upon reemployment is less than the compensation for TTD to which you are entitled, the insurer may be required to pay you TPD compensation to make up the difference. TPD can only be paid for a maximum of 24 months.    Permanent Partial Disability (PPD): When your medical condition is stable and there is an indication of a PPD as a result of your injury or OD, within 30 days, your insurer must arrange for an evaluation by a rating physician or chiropractor to determine the degree of your PPD. The amount of your PPD award depends on the date of injury, the results of the PPD evaluation and your age and wage.    Permanent Total Disability (PTD): If you are medically certified by a treating physician or chiropractor as permanently and totally disabled and have been granted a PTD status by your insurer, you are entitled to receive monthly benefits not to exceed 66 2/3% of your average monthly wage. The amount of your PTD payments is subject to reduction if you previously received a PPD award.    Vocational Rehabilitation Services: You may be eligible for vocational rehabilitation services if you are unable to return to the job due to a permanent physical impairment or permanent restrictions as a result of your injury or occupational disease.    Transportation and Per Fani Reimbursement: You may be eligible for travel expenses and per fani associated with medical treatment.  Reopening: You may be able to reopen your claim if your condition worsens after claim closure.    Appeal Process: If you disagree with a written  determination issued by the insurer or the insurer does not respond to your request, you may appeal to the Department of Administration, , by following the instructions contained in your determination letter. You must appeal the determination within 70 days from the date of the determination letter at [address], [address], [place], [place] [address], or [address], [address], [place], [place] [address]. If you disagree with the  decision, you may appeal to the Department of Administration, . You must file your appeal within 30 days from the date of the  decision letter at [address], [address], [place], [place] [address], or [address], [place], [place] [address]. If you disagree with a decision of an , you may file a petition for judicial review with the District Court. You must do so within 30 days of the Appeal Officer’s decision. You may be represented by an  at your own expense or you may contact the [facility] for possible representation.    [place]  for Injured Workers (NAIW): If you disagree with a  decision, you may request that NAIW represent you without charge at an  Hearing. For information regarding denial of benefits, you may contact the [facility] at: [address], [address], [place] [address], [phone], or [address], [place] [address], [phone]    To File a Complaint with the Division: If you wish to file a complaint with the  of the Division of Industrial Relations (DIR), please contact the Workers’ Compensation Section, [address], [address], [place], [place] [address], telephone (859) 670-1902, or [address], [address] [facility] [address], [place] [address], telephone [phone].    For assistance with Workers’ Compensation Issues: you may contact the Office of  the [facility] Consumer Health Assistance, [address], [address], [place] [address], Toll Free 1-603.911.2321, Web site: http://govcha.[facility].[place]., E-mail carlos [email or web address].[email or web address].                                                                                                                                                                               __________________________________________________________________                                    _________________            Employee Name / Signature                                                                                                                            Date                                       D-2 (rev. 10/07)

## 2017-10-22 NOTE — ED PROVIDER NOTES
ED Provider Note    Scribed for Jcarlos Bass M.D. by Osman Calvert. 10/22/2017  4:38 PM    Primary care provider: Pcp Pt States None  Means of arrival: Private vehicle  History obtained from: Patient  History limited by: None    CHIEF COMPLAINT  Chief Complaint   Patient presents with   • Digit Pain       HPI  Megan Guerra is a 32 y.o. female who presents to the Emergency Department complaining of left 3rd and 4th digit pain from an injury that occurred two days ago. 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 Patient reports initially being seen at [facility]. She is dissatisfied with the treatment and care she received there.  Patient reports associated numbness and tingling to her left 3rd and 4th digits. She is unable to bend the fingers. She otherwise does not report any other associated symptoms at this time. Patient does not report any recent fevers or active bleeding.    REVIEW OF SYSTEMS  Pertinent negatives include no recent fevers, active bleeding.  E      PAST MEDICAL HISTORY   has a past medical history of Asthma and HPV in female.    SURGICAL HISTORY   has a past surgical history that includes gyn surgery.    SOCIAL HISTORY  Social History   Substance Use Topics   • Smoking status: Current Every Day Smoker     Packs/day: 0.75     Types: Cigarettes   • Alcohol use No      History   Drug Use No       FAMILY HISTORY  None noted    CURRENT MEDICATIONS  No current facility-administered medications on file prior to encounter.      Current Outpatient Prescriptions on File Prior to Encounter   Medication Sig Dispense Refill   • hydrocodone-acetaminophen (NORCO) 5-325 MG Tab per tablet Take 1-2 Tabs by mouth every four hours as needed. 20 Tab 0   • albuterol (VENTOLIN OR PROVENTIL) 108 (90 BASE) MCG/ACT Aero Soln inhalation aerosol Inhale 2 Puffs by mouth every 6 hours as needed for Shortness of Breath.        ALLERGIES  Allergies   Allergen Reactions  "  • Ibuprofen      \"closes up my throat\"       PHYSICAL EXAM  VITAL SIGNS: /57   Pulse 87   Temp 37.6 °C (99.6 °F)   Resp 16   Ht 1.575 m (5' 2\")   Wt 49.9 kg (110 lb)   SpO2 97%   BMI 20.12 kg/m²   Constitutional: Well developed, Well nourished, No acute distress, Non-toxic appearance.   HENT: Normocephalic, atraumatic  Extremities: Left 3rd and 4th digits: Middle volar fat pad injury extending to dip, hemostatic.  Neurologic: Alert & oriented x 3, Normal motor function  Psychiatric: Normal mood and affect    RADIOLOGY  DX-HAND 3+ LEFT   Final Result      1.  No fracture or dislocation of LEFT hand.   2.  Soft tissue injury at the palmar aspect of the distal 3rd and 4th digits.        The radiologist's interpretation of all radiological studies have been reviewed by me.    COURSE & MEDICAL DECISION MAKING  Nursing notes, VS, PMSFHx reviewed in chart.    4:38 PM Patient seen and examined at bedside. Patient presents with her left 3rd and 4th digits with middle volar fat pad injury extending to dip. Discussed plan of care which includes xray evaluation.Patient understands and agrees. Ordered for DX fingers left to evaluate. Patient was treated with norco 7.5-325 mg for her symptoms.      6:09 PM Patient reevaluated at bedside. Discussed radiology results as seen above which are overall unrevealing. Will discharge patient. Advised her to follow up with occupational health at [facility]. She will be started on Keflex. Advised her to use tylenol for her pain. I reviewed the hospital policy regarding refills on narcotic pain prescriptions when the patient is being managed by another physician. The patient understands that we can administer medication here for her pain however refill of her narcotic prescription will not be done today, which adheres to hospital policy. The patient understands that it is not appropriate to come to the emergency department for acute exacerbations of chronic pain and she understands " hospital policy and her need to obtain further prescriptions for narcotics through her primary care physician.       The patient will return for new or worsening symptoms and is stable at the time of discharge.  I filled out a D39 and instruct her to follow up with [facility] or [facility] occupational health tomorrow  She is temporarily totally disabled for use of her left hand    DISPOSITION:  Patient will be discharged home in stable condition.    FINAL IMPRESSION  1. Finger injury, sequela    2. Paresthesia          IOsman (Scribe), am scribing for, and in the presence of, Jcarlos Bass M.D..    Electronically signed by: Osman Calvert (Scribe), 10/22/2017    IJcarlos M.D. personally performed the services described in this documentation, as scribed by Osman Calvert in my presence, and it is both accurate and complete.    The note accurately reflects work and decisions made by me.  Jcarlos aBss  10/22/2017  6:22 PM

## 2017-10-22 NOTE — ED NOTES
"Pt c/o left 3rd and 4th digit pain after work injury. Pt seen at [facility] and states \"they did nothing\".   "

## 2017-10-23 NOTE — ED NOTES
Pt appears stable, NAD noted. Pt states is ready to DC. Pt states understanding of all discharge instructions, follow up instructions, and discharge prescriptions. Pt changing into home clothes independently.

## 2017-10-23 NOTE — DISCHARGE INSTRUCTIONS
Please see either [facility] or [facility] occupational health tomorrow in follow-up. Cannot return to work until cleared by occupational health. Take Flexeril as directed    Paresthesia  Paresthesia is an abnormal burning or prickling sensation. This sensation is generally felt in the hands, arms, legs, or feet. However, it may occur in any part of the body. Usually, it is not painful. The feeling may be described as:  · Tingling or numbness.  · Pins and needles.  · Skin crawling.  · Buzzing.  · Limbs falling asleep.  · Itching.  Most people experience temporary (transient) paresthesia at some time in their lives. Paresthesia may occur when you breathe too quickly (hyperventilation). It can also occur without any apparent cause. Commonly, paresthesia occurs when pressure is placed on a nerve. The sensation quickly goes away after the pressure is removed. For some people, however, paresthesia is a long-lasting (chronic) condition that is caused by an underlying disorder. If you continue to have paresthesia, you may need further medical evaluation.  HOME CARE INSTRUCTIONS  Watch your condition for any changes. Taking the following actions may help to lessen any discomfort that you are feeling:  · Avoid drinking alcohol.  · Try acupuncture or massage to help relieve your symptoms.  · Keep all follow-up visits as directed by your health care provider. This is important.  SEEK MEDICAL CARE IF:  · You continue to have episodes of paresthesia.  · Your burning or prickling feeling gets worse when you walk.  · You have pain, cramps, or dizziness.  · You develop a rash.  SEEK IMMEDIATE MEDICAL CARE IF:  · You feel weak.  · You have trouble walking or moving.  · You have problems with speech, understanding, or vision.  · You feel confused.  · You cannot control your bladder or bowel movements.  · You have numbness after an injury.  · You faint.     This information is not intended to replace advice given to you by your health care  provider. Make sure you discuss any questions you have with your health care provider.     Document Released: 12/08/2003 Document Revised: 05/03/2016 Document Reviewed: 12/14/2015  Elsevier Interactive Patient Education ©2016 [employer]

## 2017-10-23 NOTE — ED NOTES
Pt ambulatory to lobby with steady gait, accompanied by family for ride home. All belongings with pt.

## 2018-03-28 ENCOUNTER — APPOINTMENT (OUTPATIENT)
Dept: RADIOLOGY | Facility: MEDICAL CENTER | Age: 33
End: 2018-03-28
Attending: EMERGENCY MEDICINE
Payer: COMMERCIAL

## 2018-03-28 ENCOUNTER — HOSPITAL ENCOUNTER (EMERGENCY)
Facility: MEDICAL CENTER | Age: 33
End: 2018-03-28
Attending: EMERGENCY MEDICINE
Payer: COMMERCIAL

## 2018-03-28 VITALS
OXYGEN SATURATION: 95 % | DIASTOLIC BLOOD PRESSURE: 74 MMHG | HEART RATE: 81 BPM | RESPIRATION RATE: 18 BRPM | WEIGHT: 97.44 LBS | BODY MASS INDEX: 17.93 KG/M2 | SYSTOLIC BLOOD PRESSURE: 107 MMHG | HEIGHT: 62 IN

## 2018-03-28 DIAGNOSIS — R51.9 NONINTRACTABLE HEADACHE, UNSPECIFIED CHRONICITY PATTERN, UNSPECIFIED HEADACHE TYPE: ICD-10-CM

## 2018-03-28 DIAGNOSIS — R63.4 WEIGHT LOSS: ICD-10-CM

## 2018-03-28 DIAGNOSIS — J32.2 ETHMOID SINUSITIS, UNSPECIFIED CHRONICITY: ICD-10-CM

## 2018-03-28 DIAGNOSIS — J32.3 SPHENOID SINUSITIS, UNSPECIFIED CHRONICITY: ICD-10-CM

## 2018-03-28 LAB
ALBUMIN SERPL BCP-MCNC: 4.5 G/DL (ref 3.2–4.9)
ALBUMIN/GLOB SERPL: 1.8 G/DL
ALP SERPL-CCNC: 36 U/L (ref 30–99)
ALT SERPL-CCNC: 11 U/L (ref 2–50)
ANION GAP SERPL CALC-SCNC: 4 MMOL/L (ref 0–11.9)
APPEARANCE UR: CLEAR
AST SERPL-CCNC: 15 U/L (ref 12–45)
BASOPHILS # BLD AUTO: 0.5 % (ref 0–1.8)
BASOPHILS # BLD: 0.02 K/UL (ref 0–0.12)
BILIRUB SERPL-MCNC: 0.8 MG/DL (ref 0.1–1.5)
BILIRUB UR QL STRIP.AUTO: NEGATIVE
BUN SERPL-MCNC: 19 MG/DL (ref 8–22)
CALCIUM SERPL-MCNC: 9.4 MG/DL (ref 8.5–10.5)
CHLORIDE SERPL-SCNC: 108 MMOL/L (ref 96–112)
CO2 SERPL-SCNC: 26 MMOL/L (ref 20–33)
COLOR UR: YELLOW
CREAT SERPL-MCNC: 0.76 MG/DL (ref 0.5–1.4)
CULTURE IF INDICATED INDCX: NO UA CULTURE
EOSINOPHIL # BLD AUTO: 0.15 K/UL (ref 0–0.51)
EOSINOPHIL NFR BLD: 3.5 % (ref 0–6.9)
ERYTHROCYTE [DISTWIDTH] IN BLOOD BY AUTOMATED COUNT: 42.9 FL (ref 35.9–50)
GLOBULIN SER CALC-MCNC: 2.5 G/DL (ref 1.9–3.5)
GLUCOSE BLD-MCNC: 113 MG/DL (ref 65–99)
GLUCOSE SERPL-MCNC: 86 MG/DL (ref 65–99)
GLUCOSE UR STRIP.AUTO-MCNC: NEGATIVE MG/DL
HCT VFR BLD AUTO: 43.2 % (ref 37–47)
HGB BLD-MCNC: 14.4 G/DL (ref 12–16)
IMM GRANULOCYTES # BLD AUTO: 0.01 K/UL (ref 0–0.11)
IMM GRANULOCYTES NFR BLD AUTO: 0.2 % (ref 0–0.9)
KETONES UR STRIP.AUTO-MCNC: NEGATIVE MG/DL
LEUKOCYTE ESTERASE UR QL STRIP.AUTO: NEGATIVE
LIPASE SERPL-CCNC: 52 U/L (ref 11–82)
LYMPHOCYTES # BLD AUTO: 1.28 K/UL (ref 1–4.8)
LYMPHOCYTES NFR BLD: 29.8 % (ref 22–41)
MCH RBC QN AUTO: 30.6 PG (ref 27–33)
MCHC RBC AUTO-ENTMCNC: 33.3 G/DL (ref 33.6–35)
MCV RBC AUTO: 91.9 FL (ref 81.4–97.8)
MICRO URNS: NORMAL
MONOCYTES # BLD AUTO: 0.33 K/UL (ref 0–0.85)
MONOCYTES NFR BLD AUTO: 7.7 % (ref 0–13.4)
NEUTROPHILS # BLD AUTO: 2.51 K/UL (ref 2–7.15)
NEUTROPHILS NFR BLD: 58.3 % (ref 44–72)
NITRITE UR QL STRIP.AUTO: NEGATIVE
NRBC # BLD AUTO: 0 K/UL
NRBC BLD-RTO: 0 /100 WBC
PH UR STRIP.AUTO: 8 [PH]
PLATELET # BLD AUTO: 178 K/UL (ref 164–446)
PMV BLD AUTO: 8.8 FL (ref 9–12.9)
POTASSIUM SERPL-SCNC: 3.9 MMOL/L (ref 3.6–5.5)
PROT SERPL-MCNC: 7 G/DL (ref 6–8.2)
PROT UR QL STRIP: NEGATIVE MG/DL
RBC # BLD AUTO: 4.7 M/UL (ref 4.2–5.4)
RBC UR QL AUTO: NEGATIVE
SODIUM SERPL-SCNC: 138 MMOL/L (ref 135–145)
SP GR UR STRIP.AUTO: 1.01
UROBILINOGEN UR STRIP.AUTO-MCNC: 0.2 MG/DL
WBC # BLD AUTO: 4.3 K/UL (ref 4.8–10.8)

## 2018-03-28 PROCEDURE — 81003 URINALYSIS AUTO W/O SCOPE: CPT

## 2018-03-28 PROCEDURE — 700111 HCHG RX REV CODE 636 W/ 250 OVERRIDE (IP): Performed by: EMERGENCY MEDICINE

## 2018-03-28 PROCEDURE — 85025 COMPLETE CBC W/AUTO DIFF WBC: CPT

## 2018-03-28 PROCEDURE — 700101 HCHG RX REV CODE 250: Performed by: EMERGENCY MEDICINE

## 2018-03-28 PROCEDURE — 70450 CT HEAD/BRAIN W/O DYE: CPT

## 2018-03-28 PROCEDURE — 71046 X-RAY EXAM CHEST 2 VIEWS: CPT

## 2018-03-28 PROCEDURE — 82962 GLUCOSE BLOOD TEST: CPT

## 2018-03-28 PROCEDURE — 83690 ASSAY OF LIPASE: CPT

## 2018-03-28 PROCEDURE — 80053 COMPREHEN METABOLIC PANEL: CPT

## 2018-03-28 PROCEDURE — 99284 EMERGENCY DEPT VISIT MOD MDM: CPT

## 2018-03-28 PROCEDURE — 94640 AIRWAY INHALATION TREATMENT: CPT

## 2018-03-28 RX ORDER — AMOXICILLIN AND CLAVULANATE POTASSIUM 875; 125 MG/1; MG/1
1 TABLET, FILM COATED ORAL 2 TIMES DAILY
Qty: 20 TAB | Refills: 0 | Status: SHIPPED | OUTPATIENT
Start: 2018-03-28 | End: 2018-04-07

## 2018-03-28 RX ORDER — PREDNISONE 20 MG/1
50 TABLET ORAL ONCE
Status: COMPLETED | OUTPATIENT
Start: 2018-03-28 | End: 2018-03-28

## 2018-03-28 RX ADMIN — PREDNISONE 50 MG: 20 TABLET ORAL at 10:52

## 2018-03-28 RX ADMIN — ALBUTEROL SULFATE 2.5 MG: 2.5 SOLUTION RESPIRATORY (INHALATION) at 11:49

## 2018-03-28 RX ADMIN — IPRATROPIUM BROMIDE 0.5 MG: 0.5 SOLUTION RESPIRATORY (INHALATION) at 11:49

## 2018-03-28 ASSESSMENT — PAIN SCALES - GENERAL: PAINLEVEL_OUTOF10: 6

## 2018-03-28 NOTE — ED TRIAGE NOTES
Chief Complaint   Patient presents with   • Headache     Pt reports to have severe allergies for 1 mo. BG in triage 113   • Blurred Vision   • Loss of Appetite   • Fatigue     Explained to pt triage process, made pt aware to tell this RN of any changes/concerns, pt verbalized understanding of process and instructions given. Pt to TESHA valdez.

## 2018-03-28 NOTE — ED NOTES
Initial contact with pt.    Pt c/o ha, blurred vision, congestion, loss of appetite for 4 months. Pt has not seen PCP for symptoms.    Pt has PMH of asthma, used inhaler last week.     Pt Aox4. GCS 15. Speaks in complete sentences. Ambulated to red 10 with steady gait. NAD noted

## 2018-03-28 NOTE — DISCHARGE INSTRUCTIONS

## 2018-03-28 NOTE — ED PROVIDER NOTES
ED Provider Note    CHIEF COMPLAINT  Chief Complaint   Patient presents with   • Headache     Pt reports to have severe allergies for 1 mo. BG in triage 113   • Blurred Vision   • Loss of Appetite   • Fatigue       HPI  Megan Guerra is a 32 y.o. female who presents with frontal headaches for 2 months, nasal congestion.  Patient states she has lost 15 pounds over the past 4 months unexplained.  She has a nausea with loss of appetite.  She states intermittently complains of blurred vision, this is currently resolved.  Vision disturbance described as blurriness in both eyes, no diplopia, no loss of vision.  Patient has had history of pyelonephritis although states the symptoms today do not feel the same.  She states symptoms currently complaining up today have been present on and off over the past 2-3 months.  No trauma.  No stiff neck.  No numbness or weakness to the extremities.  Patient has history of asthma, feels short of breath, states she is concerned about pneumonia.  Patient has related her symptoms to what she believes to be allergies although is unsure of the triggers for her symptoms    REVIEW OF SYSTEMS    Constitutional: Fatigue, denies fever  Respiratory: Short of breath, history of asthma  Cardiac: No chest pain or syncope  Gastrointestinal: Nausea, loss of appetite.  Denies diarrhea  Musculoskeletal: No acute neck or back pain  Neurologic: Headache, described as frontal.  Eyes: Intermittent blurriness       All other systems are negative.       PAST MEDICAL HISTORY  Past Medical History:   Diagnosis Date   • Asthma    • HPV in female        FAMILY HISTORY  History reviewed. No pertinent family history.    SOCIAL HISTORY  Social History     Social History   • Marital status: Single     Spouse name: N/A   • Number of children: N/A   • Years of education: N/A     Social History Main Topics   • Smoking status: Current Every Day Smoker     Packs/day: 1.00     Types: Cigarettes   • Smokeless tobacco: Never  "Used   • Alcohol use No   • Drug use: No   • Sexual activity: Not on file     Other Topics Concern   • Not on file     Social History Narrative   • No narrative on file       SURGICAL HISTORY  Past Surgical History:   Procedure Laterality Date   • GYN SURGERY      tubal ligation       CURRENT MEDICATIONS  Home Medications     Reviewed by Lubna Graham R.N. (Registered Nurse) on 03/28/18 at 1033  Med List Status: Not Addressed   Medication Last Dose Status   albuterol (VENTOLIN OR PROVENTIL) 108 (90 BASE) MCG/ACT Aero Soln inhalation aerosol 1 month Active   hydrocodone-acetaminophen (NORCO) 5-325 MG Tab per tablet  Active                ALLERGIES  Allergies   Allergen Reactions   • Ibuprofen      \"closes up my throat\"       PHYSICAL EXAM  VITAL SIGNS: /74   Pulse 81   Resp 18   Ht 1.575 m (5' 2\")   Wt 44.2 kg (97 lb 7.1 oz)   SpO2 95%   BMI 17.82 kg/m²   Constitutional:  Non-toxic appearance.   HENT: No facial swelling, posterior pharynx normal  Eyes: Anicteric, no conjunctivitis.  Peoples are 3 mm bilateral, extraocular motions normal.  No nystagmus    Cardiovascular: Normal heart rate, Normal rhythm  Pulmonary:  No wheezing, No rales.  Diminished breath sounds bilateral.  Gastrointestinal: Soft, mild suprapubic tenderness, No masses.  Abdomen is flat, bowel sounds are normal.  Skin: Warm, Dry, No cyanosis.  No petechiae, no rash  Neurologic: Speech is clear, follows commands, facial expression is symmetrical.  Sensation and strength are normal in the extremities   Psychiatric: Patient is calm and cooperative.   Musculoskeletal: Chest wall nontender.  No flank tenderness.    EKG/Labs  Results for orders placed or performed during the hospital encounter of 03/28/18   CBC WITH DIFFERENTIAL   Result Value Ref Range    WBC 4.3 (L) 4.8 - 10.8 K/uL    RBC 4.70 4.20 - 5.40 M/uL    Hemoglobin 14.4 12.0 - 16.0 g/dL    Hematocrit 43.2 37.0 - 47.0 %    MCV 91.9 81.4 - 97.8 fL    MCH 30.6 27.0 - 33.0 pg    MCHC " 33.3 (L) 33.6 - 35.0 g/dL    RDW 42.9 35.9 - 50.0 fL    Platelet Count 178 164 - 446 K/uL    MPV 8.8 (L) 9.0 - 12.9 fL    Neutrophils-Polys 58.30 44.00 - 72.00 %    Lymphocytes 29.80 22.00 - 41.00 %    Monocytes 7.70 0.00 - 13.40 %    Eosinophils 3.50 0.00 - 6.90 %    Basophils 0.50 0.00 - 1.80 %    Immature Granulocytes 0.20 0.00 - 0.90 %    Nucleated RBC 0.00 /100 WBC    Neutrophils (Absolute) 2.51 2.00 - 7.15 K/uL    Lymphs (Absolute) 1.28 1.00 - 4.80 K/uL    Monos (Absolute) 0.33 0.00 - 0.85 K/uL    Eos (Absolute) 0.15 0.00 - 0.51 K/uL    Baso (Absolute) 0.02 0.00 - 0.12 K/uL    Immature Granulocytes (abs) 0.01 0.00 - 0.11 K/uL    NRBC (Absolute) 0.00 K/uL   COMP METABOLIC PANEL   Result Value Ref Range    Sodium 138 135 - 145 mmol/L    Potassium 3.9 3.6 - 5.5 mmol/L    Chloride 108 96 - 112 mmol/L    Co2 26 20 - 33 mmol/L    Anion Gap 4.0 0.0 - 11.9    Glucose 86 65 - 99 mg/dL    Bun 19 8 - 22 mg/dL    Creatinine 0.76 0.50 - 1.40 mg/dL    Calcium 9.4 8.5 - 10.5 mg/dL    AST(SGOT) 15 12 - 45 U/L    ALT(SGPT) 11 2 - 50 U/L    Alkaline Phosphatase 36 30 - 99 U/L    Total Bilirubin 0.8 0.1 - 1.5 mg/dL    Albumin 4.5 3.2 - 4.9 g/dL    Total Protein 7.0 6.0 - 8.2 g/dL    Globulin 2.5 1.9 - 3.5 g/dL    A-G Ratio 1.8 g/dL   LIPASE   Result Value Ref Range    Lipase 52 11 - 82 U/L   URINALYSIS,CULTURE IF INDICATED   Result Value Ref Range    Color Yellow     Character Clear     Specific Gravity 1.010 <1.035    Ph 8.0 5.0 - 8.0    Glucose Negative Negative mg/dL    Ketones Negative Negative mg/dL    Protein Negative Negative mg/dL    Bilirubin Negative Negative    Urobilinogen, Urine 0.2 Negative    Nitrite Negative Negative    Leukocyte Esterase Negative Negative    Occult Blood Negative Negative    Micro Urine Req see below     Culture Indicated No UA Culture   ESTIMATED GFR   Result Value Ref Range    GFR If African American >60 >60 mL/min/1.73 m 2    GFR If Non African American >60 >60 mL/min/1.73 m 2   ACCU-CHEK  GLUCOSE   Result Value Ref Range    Glucose - Accu-Ck 113 (H) 65 - 99 mg/dL         RADIOLOGY/PROCEDURES  DX-CHEST-2 VIEWS   Final Result      No acute cardiopulmonary abnormality.      CT-HEAD W/O   Final Result      1.  No acute intracranial abnormality.   2.  Findings of sinusitis as described above.            COURSE & MEDICAL DECISION MAKING  Pertinent Labs & Imaging studies reviewed. (See chart for details)  Urinalysis negative for infection.  I find no evidence of diabetes.  She has normal renal and liver function.  Patient has mild leukopenia, red cell and platelets are normal.  Differential diagnosis included viral syndrome, sinusitis, new-onset cancer with weight loss, weight loss secondary to poor appetite.  The chest x-ray was negative.  There is no evidence of acute trauma.  CT scan of the head revealed a small 8 and sphenoidal sinusitis.  Patient has agreed to use over-the-counter Afrin nasal spray for 3 days only.  She is placed on a ten-day course of Augmentin, this will hopefully relieve the sinusitis and improve her headaches.  Patient is advised the weight loss is of unknown etiology and the need to obtain primary care.  She has been referred to [facility] to establish primary care and for ongoing workup.    FINAL IMPRESSION     1. Ethmoid sinusitis, unspecified chronicity    2. Sphenoid sinusitis, unspecified chronicity    3. Weight loss    4. Nonintractable headache, unspecified chronicity pattern, unspecified headache type                    Electronically signed by: James Leal, 3/28/2018 2:06 PM

## 2018-07-07 ENCOUNTER — HOSPITAL ENCOUNTER (EMERGENCY)
Facility: MEDICAL CENTER | Age: 33
End: 2018-07-07
Attending: EMERGENCY MEDICINE
Payer: COMMERCIAL

## 2018-07-07 VITALS
RESPIRATION RATE: 18 BRPM | SYSTOLIC BLOOD PRESSURE: 109 MMHG | WEIGHT: 99.43 LBS | HEART RATE: 61 BPM | BODY MASS INDEX: 18.3 KG/M2 | DIASTOLIC BLOOD PRESSURE: 86 MMHG | TEMPERATURE: 97.9 F | HEIGHT: 62 IN | OXYGEN SATURATION: 97 %

## 2018-07-07 DIAGNOSIS — S16.1XXA STRAIN OF NECK MUSCLE, INITIAL ENCOUNTER: ICD-10-CM

## 2018-07-07 PROCEDURE — 99283 EMERGENCY DEPT VISIT LOW MDM: CPT

## 2018-07-07 RX ORDER — PREDNISONE 20 MG/1
60 TABLET ORAL DAILY
Qty: 15 TAB | Refills: 0 | Status: SHIPPED | OUTPATIENT
Start: 2018-07-07 | End: 2018-07-12

## 2018-07-07 RX ORDER — CYCLOBENZAPRINE HCL 10 MG
10 TABLET ORAL 3 TIMES DAILY PRN
Qty: 10 TAB | Refills: 0 | Status: SHIPPED | OUTPATIENT
Start: 2018-07-07 | End: 2018-07-10

## 2018-07-07 ASSESSMENT — PAIN SCALES - GENERAL: PAINLEVEL_OUTOF10: 8

## 2018-07-07 ASSESSMENT — LIFESTYLE VARIABLES: DO YOU DRINK ALCOHOL: NO

## 2018-07-07 NOTE — DISCHARGE INSTRUCTIONS
Cervical Sprain  A cervical sprain is a stretch or tear in one or more of the tough, cord-like tissues that connect bones (ligaments) in the neck. Cervical sprains can range from mild to severe. Severe cervical sprains can cause the spinal bones (vertebrae) in the neck to be unstable. This can lead to spinal cord damage and can result in serious nervous system problems.  The amount of time that it takes for a cervical sprain to get better depends on the cause and extent of the injury. Most cervical sprains heal in 4-6 weeks.  What are the causes?  Cervical sprains may be caused by an injury (trauma), such as from a motor vehicle accident, a fall, or sudden forward and backward whipping movement of the head and neck (whiplash injury).  Mild cervical sprains may be caused by wear and tear over time, such as from poor posture, sitting in a chair that does not provide support, or looking up or down for long periods of time.  What increases the risk?  The following factors may make you more likely to develop this condition:  · Participating in activities that have a high risk of trauma to the neck. These include contact sports, auto racing, gymnastics, and diving.  · Taking risks when driving or riding in a motor vehicle, such as speeding.  · Having osteoarthritis of the spine.  · Having poor strength and flexibility of the neck.  · A previous neck injury.  · Having poor posture.  · Spending a lot of time in certain positions that put stress on the neck, such as sitting at a computer for long periods of time.  What are the signs or symptoms?  Symptoms of this condition include:  · Pain, soreness, stiffness, tenderness, swelling, or a burning sensation in the front, back, or sides of the neck.  · Sudden tightening of neck muscles that you cannot control (muscle spasms).  · Pain in the shoulders or upper back.  · Limited ability to move the neck.  · Headache.  · Dizziness.  · Nausea.  · Vomiting.  · Weakness, numbness, or  tingling in a hand or an arm.  Symptoms may develop right away after injury, or they may develop over a few days. In some cases, symptoms may go away with treatment and return (recur) over time.  How is this diagnosed?  This condition may be diagnosed based on:  · Your medical history.  · Your symptoms.  · Any recent injuries or known neck problems that you have, such as arthritis in the neck.  · A physical exam.  · Imaging tests, such as:  ¨ X-rays.  ¨ MRI.  ¨ CT scan.  How is this treated?  This condition is treated by resting and icing the injured area and doing physical therapy exercises. Depending on the severity of your condition, treatment may also include:  · Keeping your neck in place (immobilized) for periods of time. This may be done using:  ¨ A cervical collar. This supports your chin and the back of your head.  ¨ A cervical traction device. This is a sling that holds up your head. This removes weight and pressure from your neck, and it may help to relieve pain.  · Medicines that help to relieve pain and inflammation.  · Medicines that help to relax your muscles (muscle relaxants).  · Surgery. This is rare.  Follow these instructions at home:  If you have a cervical collar:  · Wear it as told by your health care provider. Do not remove the collar unless instructed by your health care provider.  · Ask your health care provider before you make any adjustments to your collar.  · If you have long hair, keep it outside of the collar.  · Ask your health care provider if you can remove the collar for cleaning and bathing. If you are allowed to remove the collar for cleaning or bathing:  ¨ Follow instructions from your health care provider about how to remove the collar safely.  ¨ Clean the collar by wiping it with mild soap and water and drying it completely.  ¨ If your collar has removable pads, remove them every 1-2 days and wash them by hand with soap and water. Let them air-dry completely before you put  them back in the collar.  ¨ Check your skin under the collar for irritation or sores. If you see any, tell your health care provider.  Managing pain, stiffness, and swelling  · If directed, use a cervical traction device as told by your health care provider.  · If directed, apply heat to the affected area before you do your physical therapy or as often as told by your health care provider. Use the heat source that your health care provider recommends, such as a moist heat pack or a heating pad.  ¨ Place a towel between your skin and the heat source.  ¨ Leave the heat on for 20-30 minutes.  ¨ Remove the heat if your skin turns bright red. This is especially important if you are unable to feel pain, heat, or cold. You may have a greater risk of getting burned.  · If directed, put ice on the affected area:  ¨ Put ice in a plastic bag.  ¨ Place a towel between your skin and the bag.  ¨ Leave the ice on for 20 minutes, 2-3 times a day.  Activity  · Do not drive while wearing a cervical collar. If you do not have a cervical collar, ask your health care provider if it is safe to drive while your neck heals.  · Do not drive or use heavy machinery while taking prescription pain medicine or muscle relaxants, unless your health care provider approves.  · Do not lift anything that is heavier than 10 lb (4.5 kg) until your health care provider tells you that it is safe.  · Rest as directed by your health care provider. Avoid positions and activities that make your symptoms worse. Ask your health care provider what activities are safe for you.  · If physical therapy was prescribed, do exercises as told by your health care provider or physical therapist.  General instructions  · Take over-the-counter and prescription medicines only as told by your health care provider.  · Do not use any products that contain nicotine or tobacco, such as cigarettes and e-cigarettes. These can delay healing. If you need help quitting, ask your  health care provider.  · Keep all follow-up visits as told by your health care provider or physical therapist. This is important.  How is this prevented?  To prevent a cervical sprain from happening again:  · Use and maintain good posture. Make any needed adjustments to your workstation to help you use good posture.  · Exercise regularly as directed by your health care provider or physical therapist.  · Avoid risky activities that may cause a cervical sprain.  Contact a health care provider if:  · You have symptoms that get worse or do not get better after 2 weeks of treatment.  · You have pain that gets worse or does not get better with medicine.  · You develop new, unexplained symptoms.  · You have sores or irritated skin on your neck from wearing your cervical collar.  Get help right away if:  · You have severe pain.  · You develop numbness, tingling, or weakness in any part of your body.  · You cannot move a part of your body (you have paralysis).  · You have neck pain along with:  ¨ Severe dizziness.  ¨ Headache.  Summary  · A cervical sprain is a stretch or tear in one or more of the tough, cord-like tissues that connect bones (ligaments) in the neck.  · Cervical sprains may be caused by an injury (trauma), such as from a motor vehicle accident, a fall, or sudden forward and backward whipping movement of the head and neck (whiplash injury).  · Symptoms may develop right away after injury, or they may develop over a few days.  · This condition is treated by resting and icing the injured area and doing physical therapy exercises.  This information is not intended to replace advice given to you by your health care provider. Make sure you discuss any questions you have with your health care provider.  Document Released: 10/14/2008 Document Revised: 08/16/2017 Document Reviewed: 08/16/2017  [employer] Interactive Patient Education © 2017 [employer]

## 2018-07-07 NOTE — ED NOTES
Pt was ambulatory at time of discharge. Pt was A&Ox4. Pt was given discharge paperwork and Rx. Pt was educated on coming back to ed if symptoms returned.

## 2018-07-07 NOTE — ED TRIAGE NOTES
"Chief Complaint   Patient presents with   • Neck Pain     Pt reports \"cracking\" own neck 2 days ago/ pt now with increased pain/ ROM limited/ pt with no numbness/tingling     Explained to pt triage process, made pt aware to tell this RN/staff of any changes/concerns, pt verbalized understanding of process and instructions given. Pt to ER josé miguel.    "

## 2018-07-07 NOTE — ED PROVIDER NOTES
"ED Provider Note    CHIEF COMPLAINT  Chief Complaint   Patient presents with   • Neck Pain     Pt reports \"cracking\" own neck 2 days ago/ pt now with increased pain/ ROM limited/ pt with no numbness/tingling       HPI  Megan Guerra is a 32 y.o. female who presents with neck discomfort. The patient was cracking her neck 2 days ago with her hands forcing her mandible to the right into the left and since that time she's had severe pain throughout the cervical region. There is no radicular component down into her arms. She does not have any paresthesias or functional loss of her extremities. She states the pain is moderate in intensity and worse with certain movements. She states the pain at this time is severe in intensity. She is unaware of any relieving factors.    REVIEW OF SYSTEMS  No recent fevers    PHYSICAL EXAM  VITAL SIGNS: /86   Pulse 86   Temp 36.6 °C (97.9 °F)   Resp 18   Ht 1.575 m (5' 2\")   Wt 45.1 kg (99 lb 6.8 oz)   SpO2 97%   BMI 18.19 kg/m²   In general the patient appears uncomfortable but nontoxic  Cervical spine the patient does have diffuse tenderness. She does not have any midline step-offs. Thoracic and lumbar spine is normal except for some paraspinal muscle discomfort in the upper thoracic region as well.  Extremities atraumatic with good distal pulses  Skin no erythema in the cervical region  Neurologic examination motor is 5 out of 5 and symmetric throughout and sensation is intact    COURSE & MEDICAL DECISION MAKING  Pertinent Labs & Imaging studies reviewed. (See chart for details)  This a 32-year-old female who presents with neck discomfort. I suspect this is more muscular etiology based on her presentation as well as my clinical findings. The patient is neurologically intact at this time. She'll be treated with a 5 day course of prednisone as well as a 3 day course of Flexeril. She'll return for increased pain or weakness to her extremities.    FINAL IMPRESSION  1. " Cervical strain       Disposition  The patient will be discharged in stable condition      Electronically signed by: Paresh Martinez, 7/7/2018 8:42 AM

## 2018-09-05 ENCOUNTER — HOSPITAL ENCOUNTER (EMERGENCY)
Facility: MEDICAL CENTER | Age: 33
End: 2018-09-05
Attending: EMERGENCY MEDICINE
Payer: COMMERCIAL

## 2018-09-05 VITALS
DIASTOLIC BLOOD PRESSURE: 80 MMHG | RESPIRATION RATE: 16 BRPM | OXYGEN SATURATION: 98 % | SYSTOLIC BLOOD PRESSURE: 132 MMHG | HEIGHT: 62 IN | WEIGHT: 97.66 LBS | TEMPERATURE: 97.7 F | BODY MASS INDEX: 17.97 KG/M2 | HEART RATE: 82 BPM

## 2018-09-05 DIAGNOSIS — K02.9 PAIN DUE TO DENTAL CARIES: ICD-10-CM

## 2018-09-05 PROCEDURE — 700102 HCHG RX REV CODE 250 W/ 637 OVERRIDE(OP): Performed by: EMERGENCY MEDICINE

## 2018-09-05 PROCEDURE — 99284 EMERGENCY DEPT VISIT MOD MDM: CPT

## 2018-09-05 PROCEDURE — A9270 NON-COVERED ITEM OR SERVICE: HCPCS | Performed by: EMERGENCY MEDICINE

## 2018-09-05 RX ORDER — HYDROCODONE BITARTRATE AND ACETAMINOPHEN 5; 325 MG/1; MG/1
1-2 TABLET ORAL EVERY 4 HOURS PRN
Qty: 20 TAB | Refills: 0 | Status: SHIPPED | OUTPATIENT
Start: 2018-09-05 | End: 2018-09-10

## 2018-09-05 RX ORDER — HYDROCODONE BITARTRATE AND ACETAMINOPHEN 5; 325 MG/1; MG/1
1 TABLET ORAL ONCE
Status: COMPLETED | OUTPATIENT
Start: 2018-09-05 | End: 2018-09-05

## 2018-09-05 RX ORDER — PENICILLIN V POTASSIUM 500 MG/1
500 TABLET ORAL ONCE
Status: COMPLETED | OUTPATIENT
Start: 2018-09-05 | End: 2018-09-05

## 2018-09-05 RX ORDER — PENICILLIN V POTASSIUM 500 MG/1
500 TABLET ORAL
Qty: 40 TAB | Refills: 0 | Status: SHIPPED | OUTPATIENT
Start: 2018-09-05 | End: 2018-09-15

## 2018-09-05 RX ADMIN — PENICILLIN V POTASIUM 500 MG: 500 TABLET OROPHARYNGEAL at 07:29

## 2018-09-05 RX ADMIN — HYDROCODONE BITARTRATE AND ACETAMINOPHEN 1 TABLET: 5; 325 TABLET ORAL at 07:29

## 2018-09-05 ASSESSMENT — LIFESTYLE VARIABLES: DO YOU DRINK ALCOHOL: NO

## 2018-09-05 ASSESSMENT — PAIN SCALES - GENERAL: PAINLEVEL_OUTOF10: 5

## 2018-09-05 NOTE — ED TRIAGE NOTES
"Megan Guerra  33 y.o. female  Chief Complaint   Patient presents with   • Tooth Ache     \"I broke my tooth about a week ago, I tried to make an appt with the dentist but they won't take me till the 12th. I can't take the pain until the 12th.\"       Pt amb to triage with steady gait for above complaint.   Pt is alert and oriented, speaking in full sentences, follows commands and responds appropriately to questions. NAD. Resp are even and unlabored.  Pt placed in lobby. Pt educated on triage process. Pt encouraged to alert staff for any changes.    "

## 2018-09-05 NOTE — ED PROVIDER NOTES
"ED Provider Note    Scribed for Samir Phillips M.D. by David Ryan. 9/5/2018, 7:16 AM.    Primary care provider: Pcp Pt States None  Means of arrival: walk in  History obtained from: Patient  History limited by: None    CHIEF COMPLAINT  Chief Complaint   Patient presents with   • Tooth Ache     \"I broke my tooth about a week ago, I tried to make an appt with the dentist but they won't take me till the 12th. I can't take the pain until the 12th.\"       HPI  Megan Guerra is a 33 y.o. female who presents complaining of left sided upper toothache. It has been present for 7 days, but is particularly bad at this time. Pain radiates locally.  Patient rates pain as moderate to severe and states that it is worse to chew or bite down. However, patient is able to take orals. No associated breathing/swallowing difficulty. No fever. No nausea, vomiting, chest pain, shortness of breath, weakness, numbness, bowel or bladder changes. There are no other medical complaints at this time. She has a follow up appointment with her dentist on the 12th.     REVIEW OF SYSTEMS  See HPI for further details. Review of systems as above, otherwise all other systems are negative.   E.    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    SOCIAL HISTORY  Social History     Social History Main Topics   • Smoking status: Current Every Day Smoker     Packs/day: 1.00     Types: Cigarettes   • Smokeless tobacco: Never Used   • Alcohol use No   • Drug use: No            SURGICAL HISTORY   has a past surgical history that includes gyn surgery.    CURRENT MEDICATIONS  Current Outpatient Prescriptions:   •  hydrocodone-acetaminophen (NORCO) 5-325 MG Tab per tablet, Take 1-2 Tabs by mouth every four hours as needed., Disp: 20 Tab, Rfl: 0  •  albuterol (VENTOLIN OR PROVENTIL) 108 (90 BASE) MCG/ACT Aero Soln inhalation aerosol, Inhale 2 Puffs by mouth every 6 hours as needed for Shortness of Breath., Disp: , Rfl: " "    ALLERGIES  Allergies   Allergen Reactions   • Ibuprofen      \"closes up my throat\"       PHYSICAL EXAM  VITAL SIGNS: /81   Pulse 85   Temp 36.5 °C (97.7 °F)   Resp 18   Ht 1.575 m (5' 2\")   Wt 44.3 kg (97 lb 10.6 oz)   SpO2 98%   BMI 17.86 kg/m²      Constitutional: Well appearing patient in mild distress from pain.  Not toxic, nor ill in appearance.  HENT: Mucus membranes moist.  Oropharynx is clear.  There are scattered caries.  There is no facial edema.  There is tenderness over tooth 15. Tooth is eroded.  Tongue is normal.  Floor of the mouth is normal.  Submental space is soft.  Posterior pharynx is normal.  Patient is tolerating secretions without difficulty. There is no evidence of Jamarcus's Angina.  Eyes: Pupils equally round.  No scleral icterus.   Neck: Full nontender range of motion; no meningismus.  Lymphatic: No cervical lymphadenopathy noted.   Cardiovascular: Regular heart rate and rhythm.  No murmurs, rubs, nor gallop appreciated.   Thorax & Lungs: Lungs are clear to auscultation with good air movement bilaterally.  No wheeze, rhonchi, nor rales.   Abdomen: Soft, with no tenderness, rebound nor guarding.  No mass, pulsatile mass, nor hepatosplenomegaly appreciated.  Skin: No purpura nor petechia noted.  Extremities/Musculoskeletal: No sign of trauma.  Musculoskeletal: Range of motion is intact in all major joints.  Neurologic: Alert & oriented.  Strength and sensation is intact all around.  No dysmetria.  Gait is normal.  Psychiatric: Normal affect appropriate for the clinical situation.    MEDICAL RECORD  I have reviewed patient's medical record and pertinent results are listed above.    COURSE & MEDICAL DECISION MAKING  I have reviewed any medical record information, laboratory studies and radiographic results as noted above.    Reviewed the patient's prescription history on [place] Prescription Monitoring Program which showed no increased risk of substance abuse.     This patient " presents with a toothache. There is no obvious dental abscess at this time which I can drain. I am prescribing the patient narcotic pain pills and antibiotics. They are asked to follow up with a dentist at soonest possibility. They are counseled that they may get worse before getting better and to return for increasing pain or swelling, breathing/swallowing difficulty, fever, any other concern at all. They are given aftercare instructions on toothache, a dental referral sheet, and they are not to drink or drive on prescribed medications.    I reviewed prescription monitoring program for patient's narcotic use before prescribing a scheduled drug.The patient will not drink alcohol nor drive with prescribed medications. The patient will return for new or worsening symptoms and is stable at the time of discharge.    The patient is referred to a primary physician for blood pressure management, diabetic screening, and for all other preventative health concerns.    In prescribing controlled substances to this patient, I certify that I have obtained and reviewed the medical history of Megan Guerra. I have also made a good matt effort to obtain applicable records from other providers who have treated the patient and records did not demonstrate any increased risk of substance abuse that would prevent me from prescribing controlled substances.     I have conducted a physical exam and documented it. I have reviewed Ms. Guerra’s prescription history as maintained by the [place] Prescription Monitoring Program.     I have assessed the patient’s risk for abuse, dependency, and addiction using the validated Opioid Risk Tool available at https://www.mdcalc.com/erbvxo-mdxt-etvw-ort-narcotic-abuse.     Given the above, I believe the benefits of controlled substance therapy outweigh the risks. The reasons for prescribing controlled substances include in my professional opinion, controlled substances are the only reasonable choice  for this patient because dental pain. Accordingly, I have discussed the risk and benefits, treatment plan, and alternative therapies with the patient.     DISPOSITION:  Patient will be discharged home in stable condition.    FOLLOW UP:  [facility], Emergency Dept  [address] [address] [phone]    If symptoms worsen    OUTPATIENT MEDICATIONS:  New Prescriptions    HYDROCODONE-ACETAMINOPHEN (NORCO) 5-325 MG TAB PER TABLET    Take 1-2 Tabs by mouth every four hours as needed for up to 5 days.    PENICILLIN V POTASSIUM (VEETID) 500 MG TAB    Take 1 Tab by mouth 4 Times a Day,Before Meals and at Bedtime for 10 days.     FINAL IMPRESSION  1. Pain due to dental caries         Electronically signed by: David Ryan, 9/5/2018 7:16 AM    The note accurately reflects work and decisions made by me.  Samir Phillips  9/5/2018  11:20 AM

## 2018-09-05 NOTE — ED NOTES
D/c instructions given and all questions answered. Prescriptions given X 2. Pt to follow up with dentist. Pt verbalized understanding. Pt belongings with patient. Pt ambulatory to lobby. Pt educated not to drive on narcotics. VSS. NAD.

## 2018-09-05 NOTE — DISCHARGE INSTRUCTIONS
Dental Caries  Dental caries (cavities) are areas of tooth decay. Cavities are usually caused by a combination of poor dental care; sugar; tobacco, alcohol, and drug abuse; decreased saliva production; and receding gums. If cavities are not treated by a dentist, they grow in size. This can cause toothaches, infection, and loss of the tooth.  Cavities of the outer tooth enamel do not cause symptoms. Dental pain from cold drinks may be the first sign the enamel has broken down and decay has spread toward the root of the tooth. This can cause the tooth to die or become infected. If a cavity is treated before it causes toothache, the tooth can usually be saved. Cavities can be prevented by good oral hygiene. Brushing your teeth in the morning and before bed, and using dental floss once daily helps remove plaque and reduce bacteria.  Candy, soft drinks, and other sources of sugar promote tooth decay by promoting the growth of bacteria in the mouth. Proper diet, fluoride, dental cleaning, and fillings are important in preventing the loss of teeth from decay. Antibiotics, root canal treatment, or dental extraction may be needed if the decay is severe. Take any pain medication or antibiotics as directed by your caregiver. It is important that you follow up with a dentist for definitive care.  SEEK MEDICAL CARE IF:   · You or your child has an oral temperature above 102° F (38.9° C).   · There is difficulty opening the mouth.   · There is difficulty swallowing or handling secretions.   · There is difficulty breathing.   · There is chest pain.   · There are worsening or concerning symptoms.   Document Released: 01/25/2006 Document Revised: 03/11/2013 Document Reviewed: 04/12/2011  [employer]® Patient Information ©2013 [employer].

## 2018-10-23 ENCOUNTER — HOSPITAL ENCOUNTER (EMERGENCY)
Facility: MEDICAL CENTER | Age: 33
End: 2018-10-23
Attending: EMERGENCY MEDICINE
Payer: COMMERCIAL

## 2018-10-23 VITALS
DIASTOLIC BLOOD PRESSURE: 75 MMHG | BODY MASS INDEX: 18.01 KG/M2 | OXYGEN SATURATION: 98 % | RESPIRATION RATE: 16 BRPM | HEART RATE: 92 BPM | HEIGHT: 62 IN | SYSTOLIC BLOOD PRESSURE: 108 MMHG | WEIGHT: 97.88 LBS | TEMPERATURE: 98.5 F

## 2018-10-23 DIAGNOSIS — B96.89 BACTERIAL VAGINOSIS: ICD-10-CM

## 2018-10-23 DIAGNOSIS — N76.0 BACTERIAL VAGINOSIS: ICD-10-CM

## 2018-10-23 DIAGNOSIS — N89.8 VAGINAL DISCHARGE: ICD-10-CM

## 2018-10-23 LAB
APPEARANCE UR: CLEAR
BACTERIA GENITAL QL WET PREP: NORMAL
BILIRUB UR QL STRIP.AUTO: NEGATIVE
C TRACH DNA SPEC QL NAA+PROBE: NEGATIVE
COLOR UR: YELLOW
GLUCOSE UR STRIP.AUTO-MCNC: NEGATIVE MG/DL
HCG UR QL: NEGATIVE
KETONES UR STRIP.AUTO-MCNC: NEGATIVE MG/DL
LEUKOCYTE ESTERASE UR QL STRIP.AUTO: NEGATIVE
MICRO URNS: NORMAL
N GONORRHOEA DNA SPEC QL NAA+PROBE: NEGATIVE
NITRITE UR QL STRIP.AUTO: NEGATIVE
PH UR STRIP.AUTO: 5.5 [PH]
PROT UR QL STRIP: NEGATIVE MG/DL
RBC UR QL AUTO: NEGATIVE
SIGNIFICANT IND 70042: NORMAL
SITE SITE: NORMAL
SOURCE SOURCE: NORMAL
SP GR UR REFRACTOMETRY: 1.01
SP GR UR STRIP.AUTO: 1.01
SPECIMEN SOURCE: NORMAL
UROBILINOGEN UR STRIP.AUTO-MCNC: 0.2 MG/DL

## 2018-10-23 PROCEDURE — 81025 URINE PREGNANCY TEST: CPT

## 2018-10-23 PROCEDURE — 87491 CHLMYD TRACH DNA AMP PROBE: CPT

## 2018-10-23 PROCEDURE — 99284 EMERGENCY DEPT VISIT MOD MDM: CPT

## 2018-10-23 PROCEDURE — 81003 URINALYSIS AUTO W/O SCOPE: CPT

## 2018-10-23 PROCEDURE — 87591 N.GONORRHOEAE DNA AMP PROB: CPT

## 2018-10-23 RX ORDER — METRONIDAZOLE 500 MG/1
500 TABLET ORAL 3 TIMES DAILY
Qty: 21 TAB | Refills: 0 | Status: SHIPPED | OUTPATIENT
Start: 2018-10-23 | End: 2018-10-30

## 2018-10-23 ASSESSMENT — ENCOUNTER SYMPTOMS
ABDOMINAL PAIN: 0
VOMITING: 0
FEVER: 0

## 2018-10-23 ASSESSMENT — PAIN SCALES - GENERAL: PAINLEVEL_OUTOF10: 0

## 2018-10-23 NOTE — ED PROVIDER NOTES
"ED Provider Note    Scribed for Ignacio Castro II, M.D. by Jesse Rutherford. 10/23/2018  12:52 PM    Means of Arrival: Walk-in  History obtained by: Patient  Limitations: None    CHIEF COMPLAINT  Chief Complaint   Patient presents with   • Vaginal discharge       HPI  Megan Guerra is a 33 y.o. female who presents to the Emergency Department for evaluation of vaginal discharge onset a week ago. She had a normal menstrual period on 10/10 and states they normally last 2-3 days, ending on 10/13. However, she began developing dark brown vaginal discharge 4 days later that has been intermittent since then. She has associated malodor discharge, denies any dysuria, abdominal pain, vomiting, rashes, or fever. She also denies any new sexual partners and last had sexual intercourse several days ago. She is not followed by OB/GYN. She has surgical history of tubal ligation.     REVIEW OF SYSTEMS  Review of Systems   Constitutional: Negative for fever.   Gastrointestinal: Negative for abdominal pain and vomiting.   Genitourinary: Negative for dysuria.        Vaginal bleeding   Skin: Negative for rash.   All other systems reviewed and are negative.  See HPI for further details.    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    SOCIAL HISTORY  Social History     Social History Main Topics   • Smoking status: Current Every Day Smoker     Packs/day: 1.00     Types: Cigarettes   • Smokeless tobacco: Never Used   • Alcohol use No   • Drug use: No     SURGICAL HISTORY   has a past surgical history that includes gyn surgery.    CURRENT MEDICATIONS  Home Medications     Reviewed by Lis Gates R.N. (Registered Nurse) on 10/23/18 at 1224  Med List Status: Complete   Medication Last Dose Status   albuterol (VENTOLIN OR PROVENTIL) 108 (90 BASE) MCG/ACT Aero Soln inhalation aerosol prn Active                ALLERGIES  Allergies   Allergen Reactions   • Ibuprofen      \"closes up my throat\" " "      PHYSICAL EXAM  VITAL SIGNS: /71   Pulse (!) 121   Temp 36.7 °C (98.1 °F)   Resp 16   Ht 1.575 m (5' 2\")   Wt 44.4 kg (97 lb 14.2 oz)   LMP 10/10/2018   SpO2 100%   BMI 17.90 kg/m²     Pulse ox interpretation: I interpret this pulse ox as normal.  Constitutional: Alert in no apparent distress.  female sitting on the edge of bed.   HENT: No signs of trauma, Bilateral external ears normal, Nose normal.   Eyes: Pupils are equal, Conjunctiva normal, Non-icteric.   Neck: Normal range of motion, No tenderness, Supple, No stridor.   Lymphatic: No lymphadenopathy noted. No inguinal lymphadenopathy.   Cardiovascular: Tachycardic, Regular rhythm, no murmurs. Symmetric distal pulses. No cyanosis of extremities. No peripheral edema of extremities.  Thorax & Lungs: Normal breath sounds, No respiratory distress, No wheezing, No chest tenderness.   Abdomen: Bowel sounds normal, Soft, No tenderness, No masses, No pulsatile masses. No peritoneal signs.  Skin: Warm, Dry, No erythema, No rash.   Pelvic Exam: Assisted by female nurse, no obvious lesions on cervix, there is brown discharge, no bright red blood. No CMT.   Musculoskeletal: Good range of motion in all major joints. No tenderness to palpation or major deformities noted.   Neurologic: Alert , Normal motor function, Normal sensory function, No focal deficits noted.   Psychiatric: Affect normal, Judgment normal, Mood normal.       DIAGNOSTIC STUDIES / PROCEDURES    LABS  Results for orders placed or performed during the hospital encounter of 10/23/18   URINALYSIS,CULTURE IF INDICATED   Result Value Ref Range    Color Yellow     Character Clear     Specific Gravity 1.009 <1.035    Ph 5.5 5.0 - 8.0    Glucose Negative Negative mg/dL    Ketones Negative Negative mg/dL    Protein Negative Negative mg/dL    Bilirubin Negative Negative    Urobilinogen, Urine 0.2 Negative    Nitrite Negative Negative    Leukocyte Esterase Negative Negative    Occult Blood " Negative Negative    Micro Urine Req see below    CHLAMYDIA/GC PCR URINE OR SWAB   Result Value Ref Range    Source Vaginal    WET PREP   Result Value Ref Range    Significant Indicator NEG     Source GEN     Site CERVICAL     Wet Prep For Parasites       No motile Trichomonas seen.  No yeast.  Few clue cells seen.     BETA-HCG QUALITATIVE URINE   Result Value Ref Range    Beta-Hcg Urine Negative Negative   REFRACTOMETER SG   Result Value Ref Range    Specific Gravity 1.010    Pertinent Labs & Imaging studies reviewed. (See chart for details)    COURSE & MEDICAL DECISION MAKING  Pertinent Labs & Imaging studies reviewed. (See chart for details)    12:52 PM This is a 33 y.o. female who presents with vaginal discharge and the differential diagnosis includes but is not limited to vaginitis, lower suspicion for pregnancy since she has surgical history of tubal ligation. There is no sign of malignancy, however cannot rule out on exam alone. Plan to test with urinalysis, chlamydia/GC, pregnancy test, and wet prep. Pelvic Exam was discussed with the Megan, which she understands.     1:06 PM Pelvic Exam was performed at this time with assisted female nurse. See above for more details.     1:32 PM Reviewed Megan's labs. Wet prep with a few clue cells, no trichomonas or yeast. UA without signs of infection. Chlamydia and Gonorrhea pending. She will have treatment for bacterial vaginosis. Negative pregnancy test. Heart rate has improved to 92. I suspect it is elevated because she is anxious in a hurry to leave for work.     The patient will not drink alcohol nor drive with prescribed medications. The patient will return for worsening symptoms and is stable at the time of discharge. The patient verbalizes understanding and will comply.    The patient is referred to a primary physician for blood pressure management, diabetic screening, and for all other preventative health concerns.    DISPOSITION:  Patient will be discharged  home in stable condition.    FOLLOW UP:  Mohsen Tamasaby, M.D.  [address]  [place] [id number] [phone]    Call   For follow up appointment, you should have a repeat pelvic exam in the next few weeks    [facility], Emergency Dept  [address]  [place] [id number] [phone]    abdominal pain, vomiting, fevers, rash come back to ER      OUTPATIENT MEDICATIONS:  New Prescriptions    METRONIDAZOLE (FLAGYL) 500 MG TAB    Take 1 Tab by mouth 3 times a day for 7 days.        FINAL IMPRESSION  1. Vaginal discharge    2. Bacterial vaginosis        IJesse (Scribe), am scribing for, and in the presence of, Ignacio Castro II, M.D..    Electronically signed by: Jesse Rutherford (Scribe), 10/23/2018    IIgnacio II, M.D. personally performed the services described in this documentation, as scribed by Jesse Rutherford in my presence, and it is both accurate and complete. C.     The note accurately reflects work and decisions made by me.  Ignacio Castro II  10/23/2018  4:59 PM

## 2018-10-23 NOTE — ED TRIAGE NOTES
Chief Complaint   Patient presents with   • Vaginal Bleeding       Patient states she has a normal period that ended on the 10/13 but since than she has had dark brown and foul smelling discharge for the last 8 days. Denies abdominal pain.     Patient placed back out in lobby and educated on triage process.

## 2018-10-23 NOTE — ED NOTES
Discharged home, pt given prescription x 1 with indication. Pt will follow up with pcp. Return to ed with worsening symptoms.

## 2018-12-18 ENCOUNTER — HOSPITAL ENCOUNTER (EMERGENCY)
Facility: MEDICAL CENTER | Age: 33
End: 2018-12-18
Attending: EMERGENCY MEDICINE
Payer: COMMERCIAL

## 2018-12-18 VITALS
HEART RATE: 100 BPM | TEMPERATURE: 98.4 F | SYSTOLIC BLOOD PRESSURE: 100 MMHG | BODY MASS INDEX: 17.7 KG/M2 | RESPIRATION RATE: 16 BRPM | OXYGEN SATURATION: 99 % | WEIGHT: 99.87 LBS | HEIGHT: 63 IN | DIASTOLIC BLOOD PRESSURE: 61 MMHG

## 2018-12-18 DIAGNOSIS — K08.89 DENTALGIA: ICD-10-CM

## 2018-12-18 PROCEDURE — 99284 EMERGENCY DEPT VISIT MOD MDM: CPT

## 2018-12-18 RX ORDER — AMOXICILLIN 500 MG/1
500 CAPSULE ORAL 3 TIMES DAILY
Qty: 21 CAP | Refills: 0 | Status: SHIPPED | OUTPATIENT
Start: 2018-12-18 | End: 2018-12-25

## 2018-12-18 RX ORDER — HYDROCODONE BITARTRATE AND ACETAMINOPHEN 5; 325 MG/1; MG/1
1 TABLET ORAL EVERY 6 HOURS PRN
Qty: 12 TAB | Refills: 0 | Status: SHIPPED | OUTPATIENT
Start: 2018-12-18 | End: 2018-12-21

## 2018-12-18 ASSESSMENT — PAIN SCALES - GENERAL: PAINLEVEL_OUTOF10: 8

## 2018-12-19 NOTE — ED PROVIDER NOTES
"      ED Provider Note    Scribed for Dr. James sAhford M.D. by Stephen Azevedo. 12/18/2018, 5:23 PM.    Primary Care Provider: Mohsen Tamasaby, M.D.  Means of arrival: Walk In   History obtained from: Patient  History limited by: None    CHIEF COMPLAINT  Chief Complaint   Patient presents with   • Dental Pain     left upper     HPI  Megan Guerra is a 33 y.o. female who presents to the Emergency Department complaining of dental pain.   The patient complains of an onset of dental pain two weeks ago. She complains of constant left upper dental pain that has been progressively worsening since onset. The patient has treated her pain with Oragel without relief. She denies any alleviating factors of her dental pain.   The patient reports she has an appointment scheduled to see a Dentist on 1/24/19, but states she cannot tolerate her dental pain.   The patient has been able to tolerate secretions well without difficulty. The patient denies any fever, nausea, vomiting, sore throat, headache, or neck pain.     REVIEW OF SYSTEMS  Pertinent positives include dental pain. Pertinent negatives include no fever, nausea, vomiting, sore throat, headache, or neck pain.     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    SOCIAL HISTORY  Social History   Substance Use Topics   • Smoking status: Current Every Day Smoker     Packs/day: 1.00     Types: Cigarettes   • Smokeless tobacco: Never Used   • Alcohol use No      History   Drug Use No     SURGICAL HISTORY   has a past surgical history that includes gyn surgery.     CURRENT MEDICATIONS  Home Medications     Reviewed by Dalila Muniz R.N. (Registered Nurse) on 12/18/18 at 1714  Med List Status: Complete   Medication Last Dose Status        Patient Avery Taking any Medications                     ALLERGIES  Allergies   Allergen Reactions   • Ibuprofen      \"closes up my throat\"     PHYSICAL EXAM  VITAL SIGNS: BP (!) 96/71   Pulse (!) 109   Temp 36.9 °C " "(98.4 °F) (Temporal)   Resp 14   Ht 1.6 m (5' 3\")   Wt 45.3 kg (99 lb 13.9 oz)   LMP 12/11/2018   SpO2 97%   BMI 17.69 kg/m²     Pulse ox interpretation: Normal   Constitutional: Well developed, Well nourished, No acute distress, Non-toxic appearance.   HENT: Normocephalic, Atraumatic, Bilateral external ears normal, Oropharynx moist, No oral exudates, Nose normal.  Tenderness to the #18 tooth.  No palpable abscess of the gumline.  No facial swelling or erythema.  Eyes: PERRLA, EOMI, Conjunctiva normal, No discharge.   Cardiovascular: Normal heart rate, Normal rhythm  Thorax & Lungs: No respiratory distress  Skin: Warm, Dry, No erythema, No rash.  Neurologic: Alert & oriented x 3, No focal deficits       COURSE & MEDICAL DECISION MAKING  Nursing notes, VS, PMSFHx reviewed in chart.    5:23 PM - Patient seen and examined at bedside. The patient presents for dental pain.   Patient has an appointment to see dental care on 1/24/19.   She was prescribed Amoxil 500 mg, Norco 5-325 mg for treatment of symptoms as needed until she is able to follow up with a Dentist.       In prescribing controlled substances to this patient, I certify that I have obtained and reviewed the medical history of Megan Guerra. I have also made a good matt effort to obtain applicable records from other providers who have treated the patient and records did not demonstrate any increased risk of substance abuse that would prevent me from prescribing controlled substances.     I have conducted a physical exam and documented it. I have reviewed Ms. Guerra’s prescription history as maintained by the [place] Prescription Monitoring Program.     I have assessed the patient’s risk for abuse, dependency, and addiction using the validated Opioid Risk Tool available at https://www.mdcalc.com/zzwdck-kkiv-mdwa-ort-narcotic-abuse.     Given the above, I believe the benefits of controlled substance therapy outweigh the risks. The reasons for " prescribing controlled substances include non-narcotic, oral analgesic alternatives have been inadequate for pain control. Accordingly, I have discussed the risk and benefits, treatment plan, and alternative therapies with the patient.     The patient will not drink alcohol nor drive with prescribed medications.      Decision Making:  This is a 33 y.o. year old who presents with female presenting with dental pain to the left upper molar.  Pain sensitivity.  Pain with eating.  No facial swelling or erythema.  No palpable abscess of the gumline.  Has an obviously fractured tooth there from obvious decay.  Pain is likely secondary to severe dental decay.  Possibly early periapical abscess or infected pulpitis.  Will treat with antibiotics and analgesic medications.  She has an allergy to NSAID therapy.  Will treat with [place] for the next few days until she can follow-up with a dentist.  Patient was discharged home in stable condition.  To follow-up with dentistry as soon as possible.      DISPOSITION:  Patient will be discharged home in stable condition.    FOLLOW UP:  Mohsen Tamasaby, M.D.  [address] [id number] [phone]    Schedule an appointment as soon as possible for a visit       [facility], Emergency Dept  [address] [facility] [address] [phone]    As needed, If symptoms worsen      OUTPATIENT MEDICATIONS:  New Prescriptions    AMOXICILLIN (AMOXIL) 500 MG CAP    Take 1 Cap by mouth 3 times a day for 7 days.    HYDROCODONE-ACETAMINOPHEN (NORCO) 5-325 MG TAB PER TABLET    Take 1 Tab by mouth every 6 hours as needed for up to 3 days.       FINAL IMPRESSION  1. Dentalgia        This dictation has been created using voice recognition software and/or scribes. The accuracy of the dictation is limited by the abilities of the software and the expertise of the scribes. I expect there may be some errors of grammar and possibly content. I made every attempt  to manually correct the errors within my dictation. However, errors related to voice recognition software and/or scribes may still exist and should be interpreted within the appropriate context.    The note accurately reflects work and decisions made by me.  James Ashford  12/18/2018  5:59 PM

## 2018-12-19 NOTE — ED TRIAGE NOTES
Pt ambulates to triage  Chief Complaint   Patient presents with   • Dental Pain     left upper   No relief with Oragel   Pt asked to wait in lobby, pt updated on triage process and pt asked to inform RN of any changes.

## 2019-01-24 ENCOUNTER — NON-PROVIDER VISIT (OUTPATIENT)
Dept: OCCUPATIONAL MEDICINE | Facility: CLINIC | Age: 34
End: 2019-01-24

## 2019-01-24 ENCOUNTER — OFFICE VISIT (OUTPATIENT)
Dept: OCCUPATIONAL MEDICINE | Facility: CLINIC | Age: 34
End: 2019-01-24

## 2019-01-24 VITALS
OXYGEN SATURATION: 99 % | HEIGHT: 63 IN | WEIGHT: 99 LBS | TEMPERATURE: 98.8 F | SYSTOLIC BLOOD PRESSURE: 102 MMHG | BODY MASS INDEX: 17.54 KG/M2 | RESPIRATION RATE: 16 BRPM | DIASTOLIC BLOOD PRESSURE: 88 MMHG

## 2019-01-24 DIAGNOSIS — Z02.1 PRE-EMPLOYMENT HEALTH SCREENING EXAMINATION: ICD-10-CM

## 2019-01-24 DIAGNOSIS — Z02.1 PRE-EMPLOYMENT DRUG SCREENING: ICD-10-CM

## 2019-01-24 LAB
AMP AMPHETAMINE: NORMAL
BREATH ALCOHOL COMMENT: NORMAL
COC COCAINE: NORMAL
INT CON NEG: NORMAL
INT CON POS: NORMAL
MET METHAMPHETAMINES: NORMAL
OPI OPIATES: NORMAL
PCP PHENCYCLIDINE: NORMAL
POC BREATHALIZER: 0 PERCENT (ref 0–0.01)
POC DRUG COMMENT 753798-OCCUPATIONAL HEALTH: NORMAL
THC: NORMAL

## 2019-01-24 PROCEDURE — 80305 DRUG TEST PRSMV DIR OPT OBS: CPT | Performed by: PREVENTIVE MEDICINE

## 2019-01-24 PROCEDURE — 82075 ASSAY OF BREATH ETHANOL: CPT | Performed by: PREVENTIVE MEDICINE

## 2019-01-24 PROCEDURE — 8915 PR COMPREHENSIVE PHYSICAL: Performed by: PREVENTIVE MEDICINE

## 2019-01-24 PROCEDURE — 92553 AUDIOMETRY AIR & BONE: CPT | Performed by: PREVENTIVE MEDICINE

## 2019-01-29 ENCOUNTER — NON-PROVIDER VISIT (OUTPATIENT)
Dept: OCCUPATIONAL MEDICINE | Facility: CLINIC | Age: 34
End: 2019-01-29

## 2019-01-29 PROCEDURE — 8911 PR MRO FEE: Performed by: INTERNAL MEDICINE

## 2023-08-06 ENCOUNTER — APPOINTMENT (OUTPATIENT)
Dept: RADIOLOGY | Facility: MEDICAL CENTER | Age: 38
End: 2023-08-06
Attending: EMERGENCY MEDICINE
Payer: MEDICAID

## 2023-08-06 ENCOUNTER — HOSPITAL ENCOUNTER (EMERGENCY)
Facility: MEDICAL CENTER | Age: 38
End: 2023-08-06
Attending: EMERGENCY MEDICINE
Payer: MEDICAID

## 2023-08-06 ENCOUNTER — APPOINTMENT (OUTPATIENT)
Dept: RADIOLOGY | Facility: MEDICAL CENTER | Age: 38
End: 2023-08-06
Payer: MEDICAID

## 2023-08-06 VITALS
DIASTOLIC BLOOD PRESSURE: 67 MMHG | OXYGEN SATURATION: 97 % | HEIGHT: 62 IN | HEART RATE: 64 BPM | WEIGHT: 94.8 LBS | BODY MASS INDEX: 17.44 KG/M2 | SYSTOLIC BLOOD PRESSURE: 102 MMHG | TEMPERATURE: 98.3 F | RESPIRATION RATE: 17 BRPM

## 2023-08-06 DIAGNOSIS — K59.00 CONSTIPATION, UNSPECIFIED CONSTIPATION TYPE: ICD-10-CM

## 2023-08-06 DIAGNOSIS — R10.10 PAIN OF UPPER ABDOMEN: ICD-10-CM

## 2023-08-06 LAB
ALBUMIN SERPL BCP-MCNC: 4.5 G/DL (ref 3.2–4.9)
ALBUMIN/GLOB SERPL: 1.6 G/DL
ALP SERPL-CCNC: 42 U/L (ref 30–99)
ALT SERPL-CCNC: 10 U/L (ref 2–50)
ANION GAP SERPL CALC-SCNC: 9 MMOL/L (ref 7–16)
APPEARANCE UR: CLEAR
AST SERPL-CCNC: 16 U/L (ref 12–45)
BASOPHILS # BLD AUTO: 0.2 % (ref 0–1.8)
BASOPHILS # BLD: 0.01 K/UL (ref 0–0.12)
BILIRUB SERPL-MCNC: 0.5 MG/DL (ref 0.1–1.5)
BILIRUB UR QL STRIP.AUTO: NEGATIVE
BUN SERPL-MCNC: 15 MG/DL (ref 8–22)
CALCIUM ALBUM COR SERPL-MCNC: 8.7 MG/DL (ref 8.5–10.5)
CALCIUM SERPL-MCNC: 9.1 MG/DL (ref 8.5–10.5)
CHLORIDE SERPL-SCNC: 106 MMOL/L (ref 96–112)
CO2 SERPL-SCNC: 23 MMOL/L (ref 20–33)
COLOR UR: YELLOW
CREAT SERPL-MCNC: 0.91 MG/DL (ref 0.5–1.4)
EKG IMPRESSION: NORMAL
EOSINOPHIL # BLD AUTO: 0.15 K/UL (ref 0–0.51)
EOSINOPHIL NFR BLD: 3.1 % (ref 0–6.9)
ERYTHROCYTE [DISTWIDTH] IN BLOOD BY AUTOMATED COUNT: 44.7 FL (ref 35.9–50)
GFR SERPLBLD CREATININE-BSD FMLA CKD-EPI: 82 ML/MIN/1.73 M 2
GLOBULIN SER CALC-MCNC: 2.8 G/DL (ref 1.9–3.5)
GLUCOSE SERPL-MCNC: 87 MG/DL (ref 65–99)
GLUCOSE UR STRIP.AUTO-MCNC: NEGATIVE MG/DL
HCG SERPL QL: NEGATIVE
HCT VFR BLD AUTO: 42.8 % (ref 37–47)
HGB BLD-MCNC: 14 G/DL (ref 12–16)
IMM GRANULOCYTES # BLD AUTO: 0.01 K/UL (ref 0–0.11)
IMM GRANULOCYTES NFR BLD AUTO: 0.2 % (ref 0–0.9)
KETONES UR STRIP.AUTO-MCNC: NEGATIVE MG/DL
LEUKOCYTE ESTERASE UR QL STRIP.AUTO: NEGATIVE
LIPASE SERPL-CCNC: 65 U/L (ref 11–82)
LYMPHOCYTES # BLD AUTO: 1.38 K/UL (ref 1–4.8)
LYMPHOCYTES NFR BLD: 28.5 % (ref 22–41)
MCH RBC QN AUTO: 30.6 PG (ref 27–33)
MCHC RBC AUTO-ENTMCNC: 32.7 G/DL (ref 32.2–35.5)
MCV RBC AUTO: 93.4 FL (ref 81.4–97.8)
MICRO URNS: NORMAL
MONOCYTES # BLD AUTO: 0.33 K/UL (ref 0–0.85)
MONOCYTES NFR BLD AUTO: 6.8 % (ref 0–13.4)
NEUTROPHILS # BLD AUTO: 2.97 K/UL (ref 1.82–7.42)
NEUTROPHILS NFR BLD: 61.2 % (ref 44–72)
NITRITE UR QL STRIP.AUTO: NEGATIVE
NRBC # BLD AUTO: 0 K/UL
NRBC BLD-RTO: 0 /100 WBC (ref 0–0.2)
PH UR STRIP.AUTO: 6 [PH] (ref 5–8)
PLATELET # BLD AUTO: 190 K/UL (ref 164–446)
PMV BLD AUTO: 9 FL (ref 9–12.9)
POTASSIUM SERPL-SCNC: 3.7 MMOL/L (ref 3.6–5.5)
PROT SERPL-MCNC: 7.3 G/DL (ref 6–8.2)
PROT UR QL STRIP: NEGATIVE MG/DL
RBC # BLD AUTO: 4.58 M/UL (ref 4.2–5.4)
RBC UR QL AUTO: NEGATIVE
SODIUM SERPL-SCNC: 138 MMOL/L (ref 135–145)
SP GR UR STRIP.AUTO: 1.01
TROPONIN T SERPL-MCNC: <6 NG/L (ref 6–19)
TROPONIN T SERPL-MCNC: <6 NG/L (ref 6–19)
UROBILINOGEN UR STRIP.AUTO-MCNC: 0.2 MG/DL
WBC # BLD AUTO: 4.9 K/UL (ref 4.8–10.8)

## 2023-08-06 PROCEDURE — 81003 URINALYSIS AUTO W/O SCOPE: CPT

## 2023-08-06 PROCEDURE — 700117 HCHG RX CONTRAST REV CODE 255: Performed by: EMERGENCY MEDICINE

## 2023-08-06 PROCEDURE — 93005 ELECTROCARDIOGRAM TRACING: CPT | Performed by: EMERGENCY MEDICINE

## 2023-08-06 PROCEDURE — 80053 COMPREHEN METABOLIC PANEL: CPT

## 2023-08-06 PROCEDURE — A9270 NON-COVERED ITEM OR SERVICE: HCPCS | Performed by: EMERGENCY MEDICINE

## 2023-08-06 PROCEDURE — 99285 EMERGENCY DEPT VISIT HI MDM: CPT

## 2023-08-06 PROCEDURE — 84484 ASSAY OF TROPONIN QUANT: CPT

## 2023-08-06 PROCEDURE — 71045 X-RAY EXAM CHEST 1 VIEW: CPT

## 2023-08-06 PROCEDURE — 700102 HCHG RX REV CODE 250 W/ 637 OVERRIDE(OP): Performed by: EMERGENCY MEDICINE

## 2023-08-06 PROCEDURE — 36415 COLL VENOUS BLD VENIPUNCTURE: CPT

## 2023-08-06 PROCEDURE — 700105 HCHG RX REV CODE 258: Mod: UD | Performed by: EMERGENCY MEDICINE

## 2023-08-06 PROCEDURE — 85025 COMPLETE CBC W/AUTO DIFF WBC: CPT

## 2023-08-06 PROCEDURE — 74177 CT ABD & PELVIS W/CONTRAST: CPT

## 2023-08-06 PROCEDURE — 84703 CHORIONIC GONADOTROPIN ASSAY: CPT

## 2023-08-06 PROCEDURE — 93005 ELECTROCARDIOGRAM TRACING: CPT

## 2023-08-06 PROCEDURE — 83690 ASSAY OF LIPASE: CPT

## 2023-08-06 RX ORDER — HYDROMORPHONE HYDROCHLORIDE 1 MG/ML
1 INJECTION, SOLUTION INTRAMUSCULAR; INTRAVENOUS; SUBCUTANEOUS ONCE
Status: DISCONTINUED | OUTPATIENT
Start: 2023-08-06 | End: 2023-08-06 | Stop reason: HOSPADM

## 2023-08-06 RX ORDER — ONDANSETRON 2 MG/ML
4 INJECTION INTRAMUSCULAR; INTRAVENOUS ONCE
Status: DISCONTINUED | OUTPATIENT
Start: 2023-08-06 | End: 2023-08-06 | Stop reason: HOSPADM

## 2023-08-06 RX ORDER — SODIUM CHLORIDE 9 MG/ML
1000 INJECTION, SOLUTION INTRAVENOUS ONCE
Status: COMPLETED | OUTPATIENT
Start: 2023-08-06 | End: 2023-08-06

## 2023-08-06 RX ORDER — ACETAMINOPHEN 325 MG/1
975 TABLET ORAL ONCE
Status: COMPLETED | OUTPATIENT
Start: 2023-08-06 | End: 2023-08-06

## 2023-08-06 RX ADMIN — SODIUM CHLORIDE 1000 ML: 9 INJECTION, SOLUTION INTRAVENOUS at 11:26

## 2023-08-06 RX ADMIN — IOHEXOL 100 ML: 350 INJECTION, SOLUTION INTRAVENOUS at 10:01

## 2023-08-06 RX ADMIN — ACETAMINOPHEN 975 MG: 325 TABLET, FILM COATED ORAL at 09:03

## 2023-08-06 ASSESSMENT — PAIN DESCRIPTION - PAIN TYPE: TYPE: ACUTE PAIN

## 2023-08-06 ASSESSMENT — PAIN SCALES - WONG BAKER: WONGBAKER_NUMERICALRESPONSE: HURTS EVEN MORE

## 2023-08-06 NOTE — ED NOTES
Chief Complaint   Patient presents with    Chest Pain    Flank Pain     left    Syncope     Last week     Pt ambulated to triage with above complaints. Chest pain started last night , mid chest and does not radiates. Left flank pain this morning , pt reports history of uti. Syncopal episode last week.   Pt to ekg room

## 2023-08-06 NOTE — ED PROVIDER NOTES
"ED Provider Note    CHIEF COMPLAINT  Chief Complaint   Patient presents with    Chest Pain    Flank Pain     left    Syncope     Last week       EXTERNAL RECORDS REVIEWED  Reviewed distant records no active records to review    HPI/ROS  LIMITATION TO HISTORY   None  OUTSIDE HISTORIAN(S):  None    Megan Guerra is a 38 y.o. female who presents for evaluation of a constellation of symptoms including intermittent epigastric and left flank pain, sensation that she has to urinate.  She denies any trauma to the chest abdomen or pelvis.  She is an otherwise healthy 38-year-old with a history of asthma but does not report any cough or wheezing.  No high fever or hemoptysis or leg swelling.  She denies any lower abdominal pain hematemesis hematochezia or melena.  She does not take any prescription medications denies pregnancy.  Only notable surgery is tubal ligation.  No known history of cardiopulmonary disease other than asthma no.  No history of heart disease or heart failure.    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    SURGICAL HISTORY   has a past surgical history that includes gyn surgery.    FAMILY HISTORY  No family history on file.    SOCIAL HISTORY  Social History     Tobacco Use    Smoking status: Every Day     Packs/day: 1.00     Types: Cigarettes    Smokeless tobacco: Never   Substance and Sexual Activity    Alcohol use: No    Drug use: No    Sexual activity: Not on file     Active tobacco no illicit drug use  CURRENT MEDICATIONS  Home Medications       Reviewed by Raissa Pulido R.N. (Registered Nurse) on 08/06/23 at 0813  Med List Status: Complete     Medication Last Dose Status        Patient Avery Taking any Medications                           ALLERGIES  Allergies   Allergen Reactions    Ibuprofen      \"closes up my throat\"       PHYSICAL EXAM  VITAL SIGNS: /67   Pulse 64   Temp 36.8 °C (98.3 °F) (Temporal)   Resp 17   Ht 1.575 m (5' 2\")   Wt 43 kg (94 " lb 12.8 oz)   SpO2 97%   BMI 17.34 kg/m²    Pulse ox interpretation: I interpret this pulse ox as normal.  Constitutional: Alert and oriented x 3, moderate distress  HEENT: Atraumatic normocephalic, pupils are equal round reactive to light extraocular movements are intact. The nares is clear, external ears are normal, mouth shows moist mucous membranes normal dentition for age  Neck: Supple, no JVD no tracheal deviation  Cardiovascular: Regular rate and rhythm no murmur rub or gallop 2+ pulses peripherally x4  Thorax & Lungs: No respiratory distress, no wheezes rales or rhonchi, No chest tenderness.   GI: Tenderness in the left upper quadrant left flank no rebound guarding or rigidity   skin: Warm dry no acute rash or lesion  Musculoskeletal: Moving all extremities with full range and 5 of 5 strength no acute  deformity  Neurologic: Cranial nerves III through XII are grossly intact no sensory deficit no cerebellar dysfunction   Psychiatric: Appropriate affect for situation at this time          DIAGNOSTIC STUDIES / PROCEDURES      LABS  Results for orders placed or performed during the hospital encounter of 08/06/23   CBC with Differential   Result Value Ref Range    WBC 4.9 4.8 - 10.8 K/uL    RBC 4.58 4.20 - 5.40 M/uL    Hemoglobin 14.0 12.0 - 16.0 g/dL    Hematocrit 42.8 37.0 - 47.0 %    MCV 93.4 81.4 - 97.8 fL    MCH 30.6 27.0 - 33.0 pg    MCHC 32.7 32.2 - 35.5 g/dL    RDW 44.7 35.9 - 50.0 fL    Platelet Count 190 164 - 446 K/uL    MPV 9.0 9.0 - 12.9 fL    Neutrophils-Polys 61.20 44.00 - 72.00 %    Lymphocytes 28.50 22.00 - 41.00 %    Monocytes 6.80 0.00 - 13.40 %    Eosinophils 3.10 0.00 - 6.90 %    Basophils 0.20 0.00 - 1.80 %    Immature Granulocytes 0.20 0.00 - 0.90 %    Nucleated RBC 0.00 0.00 - 0.20 /100 WBC    Neutrophils (Absolute) 2.97 1.82 - 7.42 K/uL    Lymphs (Absolute) 1.38 1.00 - 4.80 K/uL    Monos (Absolute) 0.33 0.00 - 0.85 K/uL    Eos (Absolute) 0.15 0.00 - 0.51 K/uL    Baso (Absolute) 0.01  0.00 - 0.12 K/uL    Immature Granulocytes (abs) 0.01 0.00 - 0.11 K/uL    NRBC (Absolute) 0.00 K/uL   Complete Metabolic Panel (CMP)   Result Value Ref Range    Sodium 138 135 - 145 mmol/L    Potassium 3.7 3.6 - 5.5 mmol/L    Chloride 106 96 - 112 mmol/L    Co2 23 20 - 33 mmol/L    Anion Gap 9.0 7.0 - 16.0    Glucose 87 65 - 99 mg/dL    Bun 15 8 - 22 mg/dL    Creatinine 0.91 0.50 - 1.40 mg/dL    Calcium 9.1 8.5 - 10.5 mg/dL    Correct Calcium 8.7 8.5 - 10.5 mg/dL    AST(SGOT) 16 12 - 45 U/L    ALT(SGPT) 10 2 - 50 U/L    Alkaline Phosphatase 42 30 - 99 U/L    Total Bilirubin 0.5 0.1 - 1.5 mg/dL    Albumin 4.5 3.2 - 4.9 g/dL    Total Protein 7.3 6.0 - 8.2 g/dL    Globulin 2.8 1.9 - 3.5 g/dL    A-G Ratio 1.6 g/dL   Troponins NOW   Result Value Ref Range    Troponin T <6 6 - 19 ng/L   Troponins in two (2) hours   Result Value Ref Range    Troponin T <6 6 - 19 ng/L   HCG Qual Serum   Result Value Ref Range    Beta-Hcg Qualitative Serum Negative Negative   URINALYSIS,CULTURE IF INDICATED    Specimen: Urine   Result Value Ref Range    Color Yellow     Character Clear     Specific Gravity 1.009 <1.035    Ph 6.0 5.0 - 8.0    Glucose Negative Negative mg/dL    Ketones Negative Negative mg/dL    Protein Negative Negative mg/dL    Bilirubin Negative Negative    Urobilinogen, Urine 0.2 Negative    Nitrite Negative Negative    Leukocyte Esterase Negative Negative    Occult Blood Negative Negative    Micro Urine Req see below    ESTIMATED GFR   Result Value Ref Range    GFR (CKD-EPI) 82 >60 mL/min/1.73 m 2   LIPASE   Result Value Ref Range    Lipase 65 11 - 82 U/L   EKG   Result Value Ref Range    Report       [facility] Emergency Dept.    Test Date:  2023  Pt Name:    TRENTON CHANDLER               Department: ER  MRN:        [id number]                      Room:  Gender:     Female                       Technician: [id number]  :        1985                   Requested By:ER TRIAGE PROTOCOL  Order #:     [id number]                    Reading MD:    Measurements  Intervals                                Axis  Rate:       64                           P:          76  VA:         131                          QRS:        79  QRSD:       84                           T:          71  QT:         389  QTc:        402    Interpretive Statements  Sinus rhythm  Nonspecific T abnrm, anterolateral leads  No previous ECG available for comparison        EKG twelve-lead interpretation by me rate 64 sinus rhythm nonspecific T wave changes including inverted T waves in V1 and V2 and hyperacute T waves in V4 and 5.  No ST segment elevation or pathological T wave inversions no evidence of arrhythmia heart block or ischemia  RADIOLOGY  I have independently interpreted the diagnostic imaging associated with this visit and am waiting the final reading from the radiologist.   My preliminary interpretation is as follows: CT scan demonstrates no acute medical or surgical process t chest x-ray is clear radiologist interpretation:   CT-ABDOMEN-PELVIS WITH   Final Result         1. Moderate stool.   2. Otherwise, normal.      DX-CHEST-PORTABLE (1 VIEW)   Final Result      No acute process.          COURSE & MEDICAL DECISION MAKING    ED Observation Status? Yes; I am placing the patient in to an observation status due to a diagnostic uncertainty as well as therapeutic intensity. Patient placed in observation status at 901 AM, 8/6/2023.     Observation plan is as follows: Establish an IV perform extensive blood testing perform serial abdominal exams cardiac exams serial troponins    Upon Reevaluation, the patient's condition has: Improved; and will be discharged.    Patient discharged from ED Observation status at 1230 (Time) 8/6 (Date).     INITIAL ASSESSMENT, COURSE AND PLAN  Care Narrative:     This is a very pleasant 38-year-old female presents here with left upper abdominal pain flank pain transient chest discomfort.  Differential diagnosis  was extensive including acute coronary syndrome possible thromboembolic disease and pulmonary embolism, acute pancreatitis cholecystitis perforated viscus bowel obstruction kidney stone pyelonephritis not exclusively.  An IV was established.  The patient to come declined parenteral pain medication although this was offered.  She was given antiemetics.  She had extensive evaluation today.  Her vital signs specifically were reassuring.  She did not have any high fever tachycardia or hypotension or hypoxia.  CBC did not demonstrate any leukocytosis and metabolic panel was reassuring.  Specifically LFTs and lipase are normal.  Urinalysis microscopy demonstrates no evidence of blood or infection.  Subsequent CT scan of the abdomen pelvis with contrast did not demonstrate any acute process other than moderate stool on chest x-ray is clear.  EKG did not clearly suggest ischemia and she never explicitly endorsed chest pain but she has serial 2-hour delta troponins which were nondetectable and she has a heart score that is 1.  The patient was feeling better after treatment.  I counseled her I think constipation is the etiology of her pain.  She will be recommended to take MiraLAX and to return as needed for new or worsening symptoms.      ADDITIONAL PROBLEM LIST    DISPOSITION AND DISCUSSIONS  I have discussed management of the patient with the following physicians and ELIANA's: None    Discussion of management with other QHP or appropriate source(s): None    Escalation of care considered, and ultimately not performed:acute inpatient care management, however at this time, the patient is most appropriate for outpatient management    Barriers to care at this time, including but not limited to: None    Decision tools and prescription drugs considered including, but not limited to: PERC rule is negative and HEART Score 1 .    FINAL DIAGNOSIS  1. Pain of upper abdomen    2. Constipation, unspecified constipation type            Electronically signed by: Trip Park M.D., 8/6/2023 9:21 AM

## 2023-08-06 NOTE — ED NOTES
Discharged home understands uso of otc medications. Pt to return for worsening pain or symptoms. All questions answered.

## 2023-08-06 NOTE — ED NOTES
IV fluids stopped as pt report they are to cold. Offered warm blankets however requests fluids to be turned off.

## 2023-08-08 ENCOUNTER — HOSPITAL ENCOUNTER (EMERGENCY)
Facility: MEDICAL CENTER | Age: 38
End: 2023-08-08
Payer: MEDICAID

## 2023-08-08 VITALS
TEMPERATURE: 97.7 F | HEART RATE: 76 BPM | DIASTOLIC BLOOD PRESSURE: 76 MMHG | RESPIRATION RATE: 16 BRPM | SYSTOLIC BLOOD PRESSURE: 112 MMHG | OXYGEN SATURATION: 100 %

## 2023-08-08 PROCEDURE — 302449 STATCHG TRIAGE ONLY (STATISTIC)

## 2023-08-08 NOTE — ED NOTES
Pt c/o continued abd pain but states she does not want to sign back in for evaluation. Pt given work note for 8/6-8/8 but encouraged to return for continued or new s/s and to follow up as directed by md

## 2025-05-14 ENCOUNTER — HOSPITAL ENCOUNTER (EMERGENCY)
Facility: MEDICAL CENTER | Age: 40
End: 2025-05-14
Attending: STUDENT IN AN ORGANIZED HEALTH CARE EDUCATION/TRAINING PROGRAM

## 2025-05-14 VITALS
HEART RATE: 97 BPM | SYSTOLIC BLOOD PRESSURE: 102 MMHG | WEIGHT: 94.8 LBS | DIASTOLIC BLOOD PRESSURE: 74 MMHG | BODY MASS INDEX: 17.44 KG/M2 | HEIGHT: 62 IN | RESPIRATION RATE: 16 BRPM | OXYGEN SATURATION: 97 % | TEMPERATURE: 97.4 F

## 2025-05-14 DIAGNOSIS — S00.411A ABRASION OF RIGHT EAR CANAL, INITIAL ENCOUNTER: ICD-10-CM

## 2025-05-14 DIAGNOSIS — H92.02 DISCOMFORT OF LEFT EAR: Primary | ICD-10-CM

## 2025-05-14 PROCEDURE — 99282 EMERGENCY DEPT VISIT SF MDM: CPT

## 2025-05-14 ASSESSMENT — PAIN DESCRIPTION - PAIN TYPE: TYPE: ACUTE PAIN

## 2025-05-14 ASSESSMENT — FIBROSIS 4 INDEX: FIB4 SCORE: 0.88

## 2025-05-15 NOTE — ED NOTES
Pt left ER prior to discussion of discharge instructions in good condition and with all belongings.

## 2025-05-15 NOTE — DISCHARGE INSTRUCTIONS
There is no evidence of a foreign body in your left ear.  Your canal looks clear with no signs of trauma.  Your eardrum looks normal without rupture.  It is likely your earbud piece fell somewhere else and is not the cause of your ear discomfort.    I would caution you against the use of Q-tips as these can cause more harm than good.  On the right you do have some signs of abrasion from the Q-tip on the right.  These also have a high risk of rupturing your eardrum or causing earwax blockage as it pushes the earwax further into your ear.

## 2025-05-15 NOTE — ED PROVIDER NOTES
"ED Provider Note    CHIEF COMPLAINT  Chief Complaint   Patient presents with    Foreign Body in Ear       EXTERNAL RECORDS REVIEWED  External ED Note [facility] ER visit June 2024 for nausea and vomiting    HPI/ROS  LIMITATION TO HISTORY   Select: : None  OUTSIDE HISTORIAN(S):  Significant other advised patient to come to ER    Megan Guerra is a 39 y.o. female who presents for concern of foreign body in the left ear.  Patient was wearing earbuds when she took her your phone out she noticed the rubber fitting was missing.  She is concerned it is in her ear.  She is finding some ear discomfort.  No changes in her hearing.  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    SURGICAL HISTORY   has a past surgical history that includes gyn surgery.    FAMILY HISTORY  History reviewed. No pertinent family history.    SOCIAL HISTORY  Social History     Tobacco Use    Smoking status: Every Day     Current packs/day: 1.00     Types: Cigarettes    Smokeless tobacco: Never   Vaping Use    Vaping status: Never Used   Substance and Sexual Activity    Alcohol use: No    Drug use: No    Sexual activity: Not on file       CURRENT MEDICATIONS  Home Medications    **Home medications have not yet been reviewed for this encounter**         ALLERGIES  Allergies[1]    PHYSICAL EXAM  VITAL SIGNS: /74   Pulse 97   Temp 36.3 °C (97.4 °F) (Temporal)   Resp 16   Ht 1.575 m (5' 2\")   Wt 43 kg (94 lb 12.8 oz)   LMP 05/12/2025   SpO2 97%   BMI 17.34 kg/m²    Pulse oximetry interpretation: I interpret the pulse oximetry as normal.  Constitutional: Awake and alert. No acute distress.  Head: NCAT.  HEENT: Normal Conjunctiva.  TMs are intact bilaterally.  I do not appreciate a foreign body in the left ear canal.  The right ear canal is notable for some minor abrasion.  Neck: Grossly normal range of motion. Airway midline.  Cardiovascular: Warm and " "well-perfused  Thorax & Lungs: No respiratory distress.   Skin: No obvious rash.  Musculoskeletal: No obvious deformity. Moves all extremities Well.  Neurologic: A&Ox4.   Psychiatric: Mood and affect are appropriate for situation.    COURSE & MEDICAL DECISION MAKING    ASSESSMENT, COURSE AND PLAN  Care Narrative:   39-year-old female concerned with foreign body to left ear  Afebrile normal vitals  On exam has minor abrasion to right ear with no signs of infection, TMs are clear bilaterally.  Left ear passage is patent all the way to the eardrum without evidence of retained rubber fitting for her ear bud.  Considered and discussed irrigation of the ear and patient declines.  She is comfortable knowing that she does not have a foreign body and has no other acute concerns.  Cautioned her with use of Q-tips as she does have a minor abrasion to her right ear.  She is asymptomatic without ear pain or hearing changes and therefore will defer management at this time.      ADDITIONAL PROBLEMS MANAGED    Minor right ear canal abrasion    DISPOSITION AND DISCUSSIONS  I have discussed management of the patient with the following physicians and ELIANA's:  none    Discussion of management with other QHP or appropriate source(s): None     Escalation of care considered, and ultimately not performed:acute inpatient care management, however at this time, the patient is most appropriate for outpatient management    Barriers to care at this time, including but not limited to: None.     Decision tools and prescription drugs considered including, but not limited to: None.    FINAL DIAGNOSIS  1. Discomfort of left ear    2. Abrasion of right ear canal, initial encounter         Electronically signed by: Cole Rose D.O., 5/14/2025 10:17 PM           [1]   Allergies  Allergen Reactions    Ibuprofen      \"closes up my throat\"     "

## 2025-05-15 NOTE — ED TRIAGE NOTES
"Chief Complaint   Patient presents with    Foreign Body in Ear     Pt arrives with complaint of left ear pain after having piece of air pod get stuck in her ear 15 min ago. Pt aox4, skin warm and dry, airway patent, rr even and unlabored, ambulatory in triage, speaking clear sentences.    BP 96/67   Pulse 87   Temp 36.8 °C (98.2 °F) (Temporal)   Resp 16   Ht 1.575 m (5' 2\")   Wt 43 kg (94 lb 12.8 oz)   LMP 05/12/2025   SpO2 95%   BMI 17.34 kg/m²     "